# Patient Record
Sex: FEMALE | Race: WHITE | Employment: STUDENT | URBAN - METROPOLITAN AREA
[De-identification: names, ages, dates, MRNs, and addresses within clinical notes are randomized per-mention and may not be internally consistent; named-entity substitution may affect disease eponyms.]

---

## 2017-01-12 ENCOUNTER — OFFICE VISIT (OUTPATIENT)
Dept: DERMATOLOGY CLINIC | Facility: CLINIC | Age: 22
End: 2017-01-12

## 2017-01-12 DIAGNOSIS — L25.9 CONTACT DERMATITIS AND ECZEMA: Primary | ICD-10-CM

## 2017-01-12 DIAGNOSIS — L30.9 DERMATITIS: ICD-10-CM

## 2017-01-12 PROCEDURE — 99212 OFFICE O/P EST SF 10 MIN: CPT | Performed by: DERMATOLOGY

## 2017-01-29 NOTE — PROGRESS NOTES
Stella Muro is a 25year old female. Patient presents with:  Rash: Patient presents for f/u of dermatitis to right hand. LOV 12/28/16. Using levocetrizine and betamethasone without improvement. Stopped using mupirocin x 1 week ago.  Rash is irrita Not on file    Alcohol Use: No    Drug Use: Not on file    Sexual Activity: Not on file   Not on file  Other Topics Concern    Caffeine Concern No    Pt has a pacemaker No    Pt has a defibrillator No    Reaction to local anesthetic No     Social History N Previous impetigo resolved. Betamethasone 3-4 times daily. Moisturizers. Hypopigmentation anticipate this will take some time to resolve. Erythema will likely persist for a while. Overall improved. Discussed. Pathophysiology discussed with patient.

## 2017-03-28 RX ORDER — LEVOCETIRIZINE DIHYDROCHLORIDE 5 MG/1
TABLET, FILM COATED ORAL
Qty: 30 TABLET | Refills: 12 | Status: SHIPPED | OUTPATIENT
Start: 2017-03-28 | End: 2018-04-19

## 2017-07-21 ENCOUNTER — OFFICE VISIT (OUTPATIENT)
Dept: DERMATOLOGY CLINIC | Facility: CLINIC | Age: 22
End: 2017-07-21

## 2017-07-21 DIAGNOSIS — R21 RASH AND OTHER NONSPECIFIC SKIN ERUPTION: ICD-10-CM

## 2017-07-21 DIAGNOSIS — M25.40 JOINT SWELLING: Primary | ICD-10-CM

## 2017-07-21 DIAGNOSIS — R53.83 FATIGUE, UNSPECIFIED TYPE: ICD-10-CM

## 2017-07-21 PROCEDURE — 99213 OFFICE O/P EST LOW 20 MIN: CPT | Performed by: DERMATOLOGY

## 2017-07-21 PROCEDURE — 99212 OFFICE O/P EST SF 10 MIN: CPT | Performed by: DERMATOLOGY

## 2017-07-31 NOTE — PROGRESS NOTES
Teodoro Aguillon is a 23year old female. Patient presents with:  Derm Problem: Patient presents for f/u of dermtitis. LOV 1/12/2017. Patient using mupirocin, TAC cream, and levocetrizine to right hand w/o much improvment. 3/10 pain.  Per mother, \"her KNEE SURGERY Left    Social History  Social History   Marital status: Single  Spouse name: N/A    Years of education: N/A  Number of children: N/A     Occupational History  None on file     Social History Main Topics   Smoking status: Never Smoker    Smoke No other skin complaints. Physical examination:  Well-developed well-nourished patient alert oriented in no acute distress.       Exam performed, including scalp, head, neck, face,nails, hair, external eyes, including conjunctival mucosa, eyelids, oral encounter diagnosis)  Rash and other nonspecific skin eruption  Fatigue, unspecified type      Orders Placed This Encounter      LETY, Direct, Reflex Titer + Spec AB      Rheumatoid Arthritis Factor      CBC W Differential W Platelet      TSH W Reflex To Fr

## 2017-08-01 ENCOUNTER — LAB ENCOUNTER (OUTPATIENT)
Dept: LAB | Age: 22
End: 2017-08-01
Attending: DERMATOLOGY
Payer: COMMERCIAL

## 2017-08-01 DIAGNOSIS — R53.83 FATIGUE, UNSPECIFIED TYPE: ICD-10-CM

## 2017-08-01 DIAGNOSIS — M25.40 JOINT SWELLING: ICD-10-CM

## 2017-08-01 DIAGNOSIS — R21 RASH AND OTHER NONSPECIFIC SKIN ERUPTION: ICD-10-CM

## 2017-08-01 LAB
ALBUMIN SERPL BCP-MCNC: 4 G/DL (ref 3.5–4.8)
ALBUMIN/GLOB SERPL: 1.3 {RATIO} (ref 1–2)
ALP SERPL-CCNC: 62 U/L (ref 39–325)
ALT SERPL-CCNC: 18 U/L (ref 14–54)
ANION GAP SERPL CALC-SCNC: 8 MMOL/L (ref 0–18)
AST SERPL-CCNC: 20 U/L (ref 15–41)
BASOPHILS # BLD: 0.1 K/UL (ref 0–0.2)
BASOPHILS NFR BLD: 1 %
BILIRUB SERPL-MCNC: 0.3 MG/DL (ref 0.3–1.2)
BUN SERPL-MCNC: 13 MG/DL (ref 8–20)
BUN/CREAT SERPL: 13.7 (ref 10–20)
CALCIUM SERPL-MCNC: 9.1 MG/DL (ref 8.5–10.5)
CHLORIDE SERPL-SCNC: 100 MMOL/L (ref 95–110)
CO2 SERPL-SCNC: 26 MMOL/L (ref 22–32)
CREAT SERPL-MCNC: 0.95 MG/DL (ref 0.5–1.5)
EOSINOPHIL # BLD: 0.1 K/UL (ref 0–0.7)
EOSINOPHIL NFR BLD: 2 %
ERYTHROCYTE [DISTWIDTH] IN BLOOD BY AUTOMATED COUNT: 18.2 % (ref 11–15)
GLOBULIN PLAS-MCNC: 3.1 G/DL (ref 2.5–3.7)
GLUCOSE SERPL-MCNC: 79 MG/DL (ref 70–99)
HCT VFR BLD AUTO: 39.5 % (ref 35–48)
HGB BLD-MCNC: 12.9 G/DL (ref 12–16)
LYMPHOCYTES # BLD: 3.1 K/UL (ref 1–4)
LYMPHOCYTES NFR BLD: 40 %
MCH RBC QN AUTO: 25.3 PG (ref 27–32)
MCHC RBC AUTO-ENTMCNC: 32.7 G/DL (ref 32–37)
MCV RBC AUTO: 77.6 FL (ref 80–100)
MONOCYTES # BLD: 0.5 K/UL (ref 0–1)
MONOCYTES NFR BLD: 7 %
NEUTROPHILS # BLD AUTO: 3.8 K/UL (ref 1.8–7.7)
NEUTROPHILS NFR BLD: 50 %
OSMOLALITY UR CALC.SUM OF ELEC: 277 MOSM/KG (ref 275–295)
PLATELET # BLD AUTO: 217 K/UL (ref 140–400)
PMV BLD AUTO: 10.1 FL (ref 7.4–10.3)
POTASSIUM SERPL-SCNC: 4.2 MMOL/L (ref 3.3–5.1)
PROT SERPL-MCNC: 7.1 G/DL (ref 5.9–8.4)
RBC # BLD AUTO: 5.09 M/UL (ref 3.7–5.4)
RHEUMATOID FACT SER QL: <5 IU/ML
SODIUM SERPL-SCNC: 134 MMOL/L (ref 136–144)
TSH SERPL-ACNC: 1.86 UIU/ML (ref 0.45–5.33)
WBC # BLD AUTO: 7.7 K/UL (ref 4–11)

## 2017-08-01 PROCEDURE — 86235 NUCLEAR ANTIGEN ANTIBODY: CPT

## 2017-08-01 PROCEDURE — 86225 DNA ANTIBODY NATIVE: CPT

## 2017-08-01 PROCEDURE — 86431 RHEUMATOID FACTOR QUANT: CPT

## 2017-08-01 PROCEDURE — 36415 COLL VENOUS BLD VENIPUNCTURE: CPT

## 2017-08-01 PROCEDURE — 86039 ANTINUCLEAR ANTIBODIES (ANA): CPT

## 2017-08-01 PROCEDURE — 86038 ANTINUCLEAR ANTIBODIES: CPT

## 2017-08-01 PROCEDURE — 84443 ASSAY THYROID STIM HORMONE: CPT

## 2017-08-01 PROCEDURE — 80053 COMPREHEN METABOLIC PANEL: CPT

## 2017-08-01 PROCEDURE — 85025 COMPLETE CBC W/AUTO DIFF WBC: CPT

## 2017-08-02 DIAGNOSIS — R53.83 OTHER FATIGUE: ICD-10-CM

## 2017-08-02 DIAGNOSIS — R79.89 ABNORMAL CBC: Primary | ICD-10-CM

## 2017-08-03 ENCOUNTER — APPOINTMENT (OUTPATIENT)
Dept: LAB | Age: 22
End: 2017-08-03
Attending: DERMATOLOGY
Payer: COMMERCIAL

## 2017-08-03 DIAGNOSIS — R53.83 OTHER FATIGUE: ICD-10-CM

## 2017-08-03 DIAGNOSIS — R79.89 ABNORMAL CBC: ICD-10-CM

## 2017-08-03 LAB
FERRITIN SERPL IA-MCNC: 6 NG/ML (ref 11–307)
IRON SATN MFR SERPL: 7 % (ref 15–50)
IRON SERPL-MCNC: 27 MCG/DL (ref 28–170)
TIBC SERPL-MCNC: 384 MCG/DL (ref 228–428)
TRANSFERRIN SERPL-MCNC: 291 MG/DL (ref 192–382)

## 2017-08-03 PROCEDURE — 84466 ASSAY OF TRANSFERRIN: CPT

## 2017-08-03 PROCEDURE — 36415 COLL VENOUS BLD VENIPUNCTURE: CPT

## 2017-08-03 PROCEDURE — 82728 ASSAY OF FERRITIN: CPT

## 2017-08-03 PROCEDURE — 83540 ASSAY OF IRON: CPT

## 2017-08-03 NOTE — PROGRESS NOTES
Unable to add labs to specimen. Patient informed to come in for blood work with verbal understanding.

## 2017-08-03 NOTE — PROGRESS NOTES
Order entered for iron studes--cbc with decreased h/h and low mcv pt c/o fatigue. Added to specimen in lab if possible to do from specimen they have.

## 2017-08-04 LAB
ANA NUCLEOLAR TITR SER IF: <40 {TITER}
DSDNA AB TITR SER: <10 {TITER}

## 2017-08-08 LAB
ENA SM IGG SER QL: NEGATIVE
ENA SM+RNP AB SER QL: NEGATIVE
ENA SS-A AB SER QL IA: NEGATIVE
ENA SS-B AB SER QL IA: NEGATIVE

## 2017-08-17 ENCOUNTER — HOSPITAL ENCOUNTER (OUTPATIENT)
Dept: GENERAL RADIOLOGY | Age: 22
Discharge: HOME | End: 2017-08-17
Attending: INTERNAL MEDICINE
Payer: COMMERCIAL

## 2017-08-17 ENCOUNTER — APPOINTMENT (OUTPATIENT)
Dept: LAB | Age: 22
End: 2017-08-17
Attending: INTERNAL MEDICINE
Payer: COMMERCIAL

## 2017-08-17 ENCOUNTER — OFFICE VISIT (OUTPATIENT)
Dept: RHEUMATOLOGY | Facility: CLINIC | Age: 22
End: 2017-08-17

## 2017-08-17 VITALS
DIASTOLIC BLOOD PRESSURE: 75 MMHG | BODY MASS INDEX: 27.74 KG/M2 | SYSTOLIC BLOOD PRESSURE: 109 MMHG | HEART RATE: 88 BPM | WEIGHT: 183 LBS | HEIGHT: 68 IN

## 2017-08-17 DIAGNOSIS — M79.641 BILATERAL HAND PAIN: ICD-10-CM

## 2017-08-17 DIAGNOSIS — M79.642 BILATERAL HAND PAIN: ICD-10-CM

## 2017-08-17 DIAGNOSIS — M25.50 POLYARTHRALGIA: ICD-10-CM

## 2017-08-17 DIAGNOSIS — M79.10 MYALGIA: ICD-10-CM

## 2017-08-17 DIAGNOSIS — M25.50 POLYARTHRALGIA: Primary | ICD-10-CM

## 2017-08-17 LAB
CK SERPL-CCNC: 98 U/L (ref 38–234)
CRP SERPL-MCNC: 0.7 MG/DL (ref 0–0.9)
ERYTHROCYTE [SEDIMENTATION RATE] IN BLOOD: 9 MM/HR (ref 0–20)
LDH SERPL L TO P-CCNC: 158 U/L (ref 98–192)

## 2017-08-17 PROCEDURE — 99214 OFFICE O/P EST MOD 30 MIN: CPT | Performed by: INTERNAL MEDICINE

## 2017-08-17 PROCEDURE — 86140 C-REACTIVE PROTEIN: CPT

## 2017-08-17 PROCEDURE — 82085 ASSAY OF ALDOLASE: CPT

## 2017-08-17 PROCEDURE — 36415 COLL VENOUS BLD VENIPUNCTURE: CPT

## 2017-08-17 PROCEDURE — 99212 OFFICE O/P EST SF 10 MIN: CPT | Performed by: INTERNAL MEDICINE

## 2017-08-17 PROCEDURE — 73120 X-RAY EXAM OF HAND: CPT | Performed by: INTERNAL MEDICINE

## 2017-08-17 PROCEDURE — 86200 CCP ANTIBODY: CPT

## 2017-08-17 PROCEDURE — 83615 LACTATE (LD) (LDH) ENZYME: CPT

## 2017-08-17 PROCEDURE — 82550 ASSAY OF CK (CPK): CPT

## 2017-08-17 PROCEDURE — 85652 RBC SED RATE AUTOMATED: CPT

## 2017-08-17 RX ORDER — MELOXICAM 15 MG/1
15 TABLET ORAL DAILY
Qty: 30 TABLET | Refills: 1 | Status: SHIPPED | OUTPATIENT
Start: 2017-08-17 | End: 2017-09-08

## 2017-08-17 RX ORDER — PNV NO.95/FERROUS FUM/FOLIC AC 28MG-0.8MG
325 TABLET ORAL DAILY
COMMUNITY

## 2017-08-17 RX ORDER — ACETAMINOPHEN 325 MG/1
325 TABLET ORAL EVERY 6 HOURS PRN
COMMUNITY

## 2017-08-17 NOTE — PROGRESS NOTES
Larisa Armando is a 23year old female who presents for Patient presents with:  Hand Pain: fingers  Wrist Pain: bilateral  Ankle Pain: bilatetral  .   HPI:     I had the pleasure of seeing Larisa Armando on 8/17/2017 for evaluation.      She is a pleasa mupirocin 2 % External Ointment Apply 1 Application topically 3 (three) times daily. Disp: 15 g Rfl: 3   triamcinolone acetonide 0.1 % External Cream Apply topically 2 (two) times daily.  Apply bid Disp: 80 g Rfl: 3      Past Medical History:   Diagnosis Size: large)   Pulse 88   Ht 5' 8\" (1.727 m)   Wt 183 lb (83 kg)   BMI 27.83 kg/m²   HEENT: Clear oropharynx, no oral ulcers, EOM intact, clear sclear, PERRLA, pleasant, no acute distress, no CAD, no neck tendnerness, good ROM,   CVS: RRR, no murmurs  RS: fL  10.1   Neutrophils %      %  50   Lymphocytes %      %  40   Monocytes %      %  7   Eosinophils %      %  2   Basophils %      %  1   Neutrophils Absolute      1.8 - 7.7 K/UL  3.8   Lymphocytes Absolute      1.0 - 4.0 K/UL  3.1   Monocytes Absolute

## 2017-08-18 ENCOUNTER — TELEPHONE (OUTPATIENT)
Dept: RHEUMATOLOGY | Facility: CLINIC | Age: 22
End: 2017-08-18

## 2017-08-18 LAB — CCP IGG SERPL-ACNC: 0.4 U/ML (ref 0–6.9)

## 2017-08-19 LAB — ALDOLASE, SERUM: 5.8 U/L

## 2017-09-08 RX ORDER — MELOXICAM 15 MG/1
15 TABLET ORAL DAILY
Qty: 30 TABLET | Refills: 3 | Status: SHIPPED | OUTPATIENT
Start: 2017-09-08 | End: 2019-12-30 | Stop reason: ALTCHOICE

## 2017-09-08 NOTE — TELEPHONE ENCOUNTER
LOV 8/17/17 F/U 9/29/17    Med pended for PP    Component      Latest Ref Rng & Units 8/1/2017   Glucose      70 - 99 mg/dL 79   Sodium      136 - 144 mmol/L 134 (L)   Potassium      3.3 - 5.1 mmol/L 4.2   Chloride      95 - 110 mmol/L 100   Carbon Dioxide

## 2017-09-29 ENCOUNTER — OFFICE VISIT (OUTPATIENT)
Dept: RHEUMATOLOGY | Facility: CLINIC | Age: 22
End: 2017-09-29

## 2017-09-29 VITALS
BODY MASS INDEX: 28.64 KG/M2 | SYSTOLIC BLOOD PRESSURE: 107 MMHG | HEIGHT: 68 IN | HEART RATE: 80 BPM | DIASTOLIC BLOOD PRESSURE: 72 MMHG | WEIGHT: 189 LBS | TEMPERATURE: 97 F

## 2017-09-29 DIAGNOSIS — M79.641 BILATERAL HAND PAIN: ICD-10-CM

## 2017-09-29 DIAGNOSIS — M25.50 POLYARTHRALGIA: Primary | ICD-10-CM

## 2017-09-29 DIAGNOSIS — M79.642 BILATERAL HAND PAIN: ICD-10-CM

## 2017-09-29 PROCEDURE — 99212 OFFICE O/P EST SF 10 MIN: CPT | Performed by: INTERNAL MEDICINE

## 2017-09-29 PROCEDURE — 99213 OFFICE O/P EST LOW 20 MIN: CPT | Performed by: INTERNAL MEDICINE

## 2017-09-29 RX ORDER — NABUMETONE 750 MG/1
750 TABLET, FILM COATED ORAL 2 TIMES DAILY
Qty: 60 TABLET | Refills: 1 | Status: SHIPPED | OUTPATIENT
Start: 2017-09-29 | End: 2017-11-14

## 2017-09-29 NOTE — PROGRESS NOTES
Zita Gomez is a 23year old female who presents for Patient presents with:  Joint Pain: finger and ankle pain  . HPI:     I had the pleasure of seeing Zita Gomez on 8/17/2017 for evaluation.      She is a pleasant 23year old who has polyartha acetaminophen 325 MG Oral Tab Take 325 mg by mouth every 6 (six) hours as needed.  Disp:  Rfl:    mupirocin 2 % External Ointment Use tid to infection Disp: 30 g Rfl: 2   LEVOCETIRIZINE DIHYDROCHLORIDE 5 MG Oral Tab TAKE 1 TABLET BY MOUTH IN THE EVENING D no heartburn, no dyshpagia, no BRBPR or melena  : no dysuria, n  Neuro: No numbness or tingling, no headache, no hx of seizures,   Psych: no hx of anxiety or depression  ENDO: no hx of thyroid disease, no hx of DM  Joint/Muscluskeltal: see HPI,   All oth 5.40 M/UL  5.09   Hemoglobin      12.0 - 16.0 g/dL  12.9   Hematocrit      35.0 - 48.0 %  39.5   MCV      80.0 - 100.0 fL  77.6 (L)   MCH      27.0 - 32.0 pg  25.3 (L)   MCHC      32.0 - 37.0 g/dl  32.7   RDW      11.0 - 15.0 %  18.2 (H)   Platelet Count off on mri for now  - conisder in the future.        Janae Rosenberg MD  8/17/2017  4:07 PM

## 2017-09-29 NOTE — PATIENT INSTRUCTIONS
1. Stop meloxicam  2. Try nabumetone 750mg twice a day   3. Plan to repeat tests or work up in 6 months   4. Hold off on mri for now  - conisder in the future.

## 2017-11-15 RX ORDER — NABUMETONE 750 MG/1
TABLET, FILM COATED ORAL
Qty: 60 TABLET | Refills: 1 | Status: SHIPPED | OUTPATIENT
Start: 2017-11-15 | End: 2018-03-22

## 2018-01-03 ENCOUNTER — OFFICE VISIT (OUTPATIENT)
Dept: DERMATOLOGY CLINIC | Facility: CLINIC | Age: 23
End: 2018-01-03

## 2018-01-03 DIAGNOSIS — L30.9 DERMATITIS: Primary | ICD-10-CM

## 2018-01-03 PROCEDURE — 99212 OFFICE O/P EST SF 10 MIN: CPT | Performed by: DERMATOLOGY

## 2018-01-03 PROCEDURE — 99213 OFFICE O/P EST LOW 20 MIN: CPT | Performed by: DERMATOLOGY

## 2018-01-03 RX ORDER — ADAPALENE 45 G/G
GEL TOPICAL
Qty: 45 G | Refills: 6 | Status: SHIPPED | OUTPATIENT
Start: 2018-01-03 | End: 2018-11-29

## 2018-01-03 RX ORDER — CLINDAMYCIN PHOSPHATE 10 MG/G
1 GEL TOPICAL 2 TIMES DAILY
Qty: 60 G | Refills: 12 | Status: SHIPPED | OUTPATIENT
Start: 2018-01-03 | End: 2018-11-29

## 2018-01-10 RX ORDER — BETAMETHASONE DIPROPIONATE 0.5 MG/G
CREAM TOPICAL
Qty: 45 G | Refills: 3 | Status: SHIPPED | OUTPATIENT
Start: 2018-01-10 | End: 2019-06-17

## 2018-01-14 NOTE — PROGRESS NOTES
Kalia Harrison is a 23year old female. Patient presents with:  Rash: Pt here for 6 mo f/u; sent by rheumatologist for rash at L arm and L breast, comes & goes, itchy. Pt using betameth w/ relief from itching but rash remains.   Pt clrd by [facility] NABUMETONE 750 MG Oral Tab TAKE 1 TABLET BY MOUTH TWICE DAILY Disp: 60 tablet Rfl: 1   Ferrous Sulfate (IRON) 325 (65 Fe) MG Oral Tab Take 325 mg by mouth daily.  Disp:  Rfl:    acetaminophen 325 MG Oral Tab Take 325 mg by mouth every 6 (six) hours as nee HPI :      Patient presents with:  Rash: Pt here for 6 mo f/u; sent by rheumatologist for rash at L arm and L breast, comes & goes, itchy. Pt using betameth w/ relief from itching but rash remains. Pt clrd by rheumatologist (no dx). Folliculitis, more diffuse dermographism, rashes have improved. Patient not in the pool as much which seems to be helping. Continue follow-up as planned with rheumatology. Labs reviewed noncontributory meds in grid. Skin care instructions reviewed.   Nicci Mcdonald

## 2018-03-23 RX ORDER — NABUMETONE 750 MG/1
TABLET, FILM COATED ORAL
Qty: 60 TABLET | Refills: 1 | Status: SHIPPED | OUTPATIENT
Start: 2018-03-23 | End: 2018-08-22

## 2018-04-19 RX ORDER — LEVOCETIRIZINE DIHYDROCHLORIDE 5 MG/1
TABLET, FILM COATED ORAL
Qty: 30 TABLET | Refills: 12 | Status: SHIPPED | OUTPATIENT
Start: 2018-04-19 | End: 2019-05-23

## 2018-08-23 RX ORDER — NABUMETONE 750 MG/1
TABLET, FILM COATED ORAL
Qty: 60 TABLET | Refills: 1 | Status: SHIPPED | OUTPATIENT
Start: 2018-08-23 | End: 2019-02-16

## 2018-08-23 NOTE — TELEPHONE ENCOUNTER
LOV:9-29  Last Filled:3-23, #60 with 1 refill  Labs:8-1-17, ALT 18 and AST 20  No future appointments.     Please Advise

## 2018-08-23 NOTE — TELEPHONE ENCOUNTER
Phoned patient and informed her to schedule an appointment with Dr. Shaila Arroyo in September. Patient to call back to schedule.

## 2018-09-12 ENCOUNTER — OFFICE VISIT (OUTPATIENT)
Dept: DERMATOLOGY CLINIC | Facility: CLINIC | Age: 23
End: 2018-09-12
Payer: COMMERCIAL

## 2018-09-12 DIAGNOSIS — L70.0 ACNE VULGARIS: ICD-10-CM

## 2018-09-12 DIAGNOSIS — L01.00 IMPETIGO: ICD-10-CM

## 2018-09-12 DIAGNOSIS — R23.8 BLISTERS OF MULTIPLE SITES: Primary | ICD-10-CM

## 2018-09-12 DIAGNOSIS — L30.9 DERMATITIS: ICD-10-CM

## 2018-09-12 PROCEDURE — 99212 OFFICE O/P EST SF 10 MIN: CPT | Performed by: DERMATOLOGY

## 2018-09-12 PROCEDURE — 99213 OFFICE O/P EST LOW 20 MIN: CPT | Performed by: DERMATOLOGY

## 2018-09-14 ENCOUNTER — APPOINTMENT (OUTPATIENT)
Dept: LAB | Age: 23
End: 2018-09-14
Attending: DERMATOLOGY
Payer: COMMERCIAL

## 2018-09-14 DIAGNOSIS — R23.8 BLISTERS OF MULTIPLE SITES: ICD-10-CM

## 2018-09-14 PROCEDURE — 36415 COLL VENOUS BLD VENIPUNCTURE: CPT

## 2018-09-14 PROCEDURE — 84311 SPECTROPHOTOMETRY: CPT

## 2018-09-19 LAB — PORPHYRINS TOTAL, PLASMA: <1 MCG/DL

## 2018-09-24 NOTE — PROGRESS NOTES
Momo Killian is a 26year old female. Patient presents with:  Acne: LOV 1-3-18. Pt c/o redness, sm bumps at cheeks, using clindagel and adapalene once daily, followed by lotion. Pt c/o burning after this tx. Also c/o dry & oily skin at face. TAKE 1 TABLET BY MOUTH IN THE EVENING Disp: 30 tablet Rfl: 12   betamethasone dipropionate 0.05 % External Cream Use bid Disp: 45 g Rfl: 3   Clindamycin Phosphate 1 % External Gel Apply 1 Application topically 2 (two) times daily.  Use bid as directed Disp: Concerns:         Service: Not Asked        Blood Transfusions: Not Asked        Caffeine Concern: No        Occupational Exposure: Not Asked        Hobby Hazards: Not Asked        Sleep Concern: Not Asked        Stress Concern: Not Asked        We presents with concerns above. Denies any other systemic complaints. No fevers, chills, night sweats, photosensitivity, lymph node swelling. No other skin complaints. History, medications, allergies as noted.       Nothing new or different no unusual exp reviewed noncontributory meds in grid. Skin care instructions reviewed. [place] use of emollients. Pathophysiology reviewed. Consider Contac allergy in differential.  Consider patch testing.   Patient will let us know how they are doing over the next se

## 2018-10-20 ENCOUNTER — HOSPITAL ENCOUNTER (OUTPATIENT)
Dept: GENERAL RADIOLOGY | Facility: HOSPITAL | Age: 23
Discharge: HOME | End: 2018-10-20
Attending: INTERNAL MEDICINE
Payer: COMMERCIAL

## 2018-10-20 ENCOUNTER — OFFICE VISIT (OUTPATIENT)
Dept: RHEUMATOLOGY | Facility: CLINIC | Age: 23
End: 2018-10-20
Payer: COMMERCIAL

## 2018-10-20 VITALS
HEIGHT: 68 IN | BODY MASS INDEX: 30.62 KG/M2 | WEIGHT: 202 LBS | SYSTOLIC BLOOD PRESSURE: 117 MMHG | HEART RATE: 89 BPM | DIASTOLIC BLOOD PRESSURE: 80 MMHG

## 2018-10-20 DIAGNOSIS — L30.9 DERMATITIS: ICD-10-CM

## 2018-10-20 DIAGNOSIS — M54.89 INFLAMMATORY BACK PAIN: ICD-10-CM

## 2018-10-20 DIAGNOSIS — M13.0 POLYARTHRITIS: Primary | ICD-10-CM

## 2018-10-20 PROCEDURE — 72202 X-RAY EXAM SI JOINTS 3/> VWS: CPT | Performed by: INTERNAL MEDICINE

## 2018-10-20 PROCEDURE — 99212 OFFICE O/P EST SF 10 MIN: CPT | Performed by: INTERNAL MEDICINE

## 2018-10-20 PROCEDURE — 99214 OFFICE O/P EST MOD 30 MIN: CPT | Performed by: INTERNAL MEDICINE

## 2018-10-20 NOTE — PROGRESS NOTES
Stella Muro is a 26year old female who presents for Patient presents with:  Joint Pain  Back Pain  Finger Pain  . HPI:     I had the pleasure of seeing Stella Muro on 8/17/2017 for evaluation.      She is a pleasant 23year old who has polyarth it's constant. This doesn't stop her from doing things. It feels sore and achy. She has redness over her cheeks - she was given rosacea meds   She gets sores on her hands - for dermatitis.  - in 9/2018 - she had porphyria like blisters - it started 2 year Onset   • Asthma Mother    • Arthritis Mother         Rheumatoid Arthritis   • Fibromyalgia Mother    • Ear Problems Brother         Hearing Loss   • Asthma Brother    • Arthritis Maternal Grandmother         Rheumatoid Arthritis   • Diabetes Maternal [facility] and the [place] right hand. She is double jointed inher finger -   Tender in b/l ankels, no swleling   Tender in right 1st toe - with toe nail deforminty - due to mutliple psedomonas infection as a child   Mid line lower back tendnerss.    No si joint tendnerness  EXTREM K/UL  0.1   Iron, Serum      28 - 170 mcg/dL 27 (L)    Iron Bind. Cap.(TIBC)      228 - 428 mcg/dL 384    IRON SATURATION      15 - 50 % 7 (L)    TRANSFERRIN      192 - 382 mg/dL 291    Anti-Sjogren's A      Negative  Negative   Anti-Sjogren's B      Negati 11:58 AM  - [place]ed [place]  through [address]

## 2018-10-20 NOTE — PATIENT INSTRUCTIONS
1. Cont. nabumetone 750mg twice a day   2. Check MRI right hand   3. Lab tests   4.  Check si joint xray

## 2018-10-22 ENCOUNTER — LAB ENCOUNTER (OUTPATIENT)
Dept: LAB | Age: 23
End: 2018-10-22
Attending: INTERNAL MEDICINE
Payer: COMMERCIAL

## 2018-10-22 DIAGNOSIS — M13.0 POLYARTHRITIS: ICD-10-CM

## 2018-10-22 DIAGNOSIS — M54.89 INFLAMMATORY BACK PAIN: ICD-10-CM

## 2018-10-22 PROCEDURE — 86235 NUCLEAR ANTIGEN ANTIBODY: CPT

## 2018-10-22 PROCEDURE — 80053 COMPREHEN METABOLIC PANEL: CPT

## 2018-10-22 PROCEDURE — 86225 DNA ANTIBODY NATIVE: CPT

## 2018-10-22 PROCEDURE — 36415 COLL VENOUS BLD VENIPUNCTURE: CPT

## 2018-10-22 PROCEDURE — 86812 HLA TYPING A B OR C: CPT

## 2018-10-22 PROCEDURE — 85027 COMPLETE CBC AUTOMATED: CPT

## 2018-10-22 PROCEDURE — 85652 RBC SED RATE AUTOMATED: CPT

## 2018-10-22 PROCEDURE — 86039 ANTINUCLEAR ANTIBODIES (ANA): CPT

## 2018-10-22 PROCEDURE — 86038 ANTINUCLEAR ANTIBODIES: CPT

## 2018-10-22 PROCEDURE — 86140 C-REACTIVE PROTEIN: CPT

## 2018-11-02 ENCOUNTER — TELEPHONE (OUTPATIENT)
Dept: DERMATOLOGY CLINIC | Facility: CLINIC | Age: 23
End: 2018-11-02

## 2018-11-02 NOTE — TELEPHONE ENCOUNTER
Pt asking Metronidazole cream. Pt states not helping with redness. Burns when putting on. Is there anything else she can use?

## 2018-11-02 NOTE — TELEPHONE ENCOUNTER
LOV 9/2018. Please advise if you would like pt decrease frequency of metrocream.Alt medication? Currently using BID.  Please advise

## 2018-11-03 RX ORDER — DESONIDE 0.5 MG/G
CREAM TOPICAL
Qty: 15 G | Refills: 0 | Status: SHIPPED | OUTPATIENT
Start: 2018-11-03 | End: 2021-01-30

## 2018-11-03 NOTE — TELEPHONE ENCOUNTER
LMTCB. Per [address] Mckinney pt ok to stay on 3x week for a couple of weeks only. Please see the rest of the message below.

## 2018-11-03 NOTE — TELEPHONE ENCOUNTER
Desonide cream--bid x1 week if better then decrease to qd and then to 3x per week. This is a steroid cream, safe for the face for a short time.       If redness not getting better over the next 2-3 weeks then try mixing the metrocream with this to see if t

## 2018-11-04 ENCOUNTER — HOSPITAL ENCOUNTER (OUTPATIENT)
Dept: MRI IMAGING | Facility: HOSPITAL | Age: 23
Discharge: HOME | End: 2018-11-04
Attending: INTERNAL MEDICINE
Payer: COMMERCIAL

## 2018-11-04 ENCOUNTER — HOSPITAL ENCOUNTER (OUTPATIENT)
Dept: GENERAL RADIOLOGY | Facility: HOSPITAL | Age: 23
Discharge: HOME | End: 2018-11-04
Attending: RADIOLOGY
Payer: COMMERCIAL

## 2018-11-04 DIAGNOSIS — M13.0 POLYARTHRITIS: ICD-10-CM

## 2018-11-04 DIAGNOSIS — R52 PAIN: ICD-10-CM

## 2018-11-04 PROCEDURE — A9575 INJ GADOTERATE MEGLUMI 0.1ML: HCPCS | Performed by: INTERNAL MEDICINE

## 2018-11-04 PROCEDURE — 73220 MRI UPPR EXTREMITY W/O&W/DYE: CPT | Performed by: INTERNAL MEDICINE

## 2018-11-04 PROCEDURE — 73130 X-RAY EXAM OF HAND: CPT | Performed by: RADIOLOGY

## 2018-11-06 ENCOUNTER — TELEPHONE (OUTPATIENT)
Dept: RHEUMATOLOGY | Facility: CLINIC | Age: 23
End: 2018-11-06

## 2018-11-07 ENCOUNTER — TELEPHONE (OUTPATIENT)
Dept: RHEUMATOLOGY | Facility: CLINIC | Age: 23
End: 2018-11-07

## 2018-11-07 NOTE — TELEPHONE ENCOUNTER
Pt called in wanting to discuss results of right hand MRI with Dr. Philip Puentes.    11/04 MRI conclusion:    \"Unremarkable right hand MRI without synovitis or osseous erosions to suggest inflammatory arthritis. \"

## 2018-11-08 NOTE — TELEPHONE ENCOUNTER
Talked to pt. -  her back and hand pain is around 3/10 pain. She has more back pain with period. She takes the nabumetone 750mg twice a day. She will continue this    - no evidence for SLe or sernegative sponsylathritis at this point. - will cont.  nsiads

## 2018-12-04 ENCOUNTER — PATIENT MESSAGE (OUTPATIENT)
Dept: PEDIATRICS CLINIC | Facility: CLINIC | Age: 23
End: 2018-12-04

## 2018-12-05 RX ORDER — CLINDAMYCIN PHOSPHATE 10 MG/G
1 GEL TOPICAL 2 TIMES DAILY
Qty: 60 G | Refills: 12 | Status: SHIPPED | OUTPATIENT
Start: 2018-12-05 | End: 2019-12-27

## 2018-12-05 RX ORDER — ADAPALENE 45 G/G
GEL TOPICAL
Qty: 45 G | Refills: 6 | Status: SHIPPED | OUTPATIENT
Start: 2018-12-05 | End: 2019-12-27

## 2018-12-05 NOTE — TELEPHONE ENCOUNTER
From: Kelli Gonzalez  To: Amber Luna MD  Sent: 12/4/2018 6:15 PM CST  Subject: Non-Urgent Medical Question    Hi, [facility].  I need documentation of my mumps vaccine by Friday 12/7/18, so I will be abl No

## 2019-01-07 ENCOUNTER — TELEPHONE (OUTPATIENT)
Dept: INTERNAL MEDICINE CLINIC | Facility: CLINIC | Age: 24
End: 2019-01-07

## 2019-01-09 NOTE — TELEPHONE ENCOUNTER
LO(V 9/12/18, pt has been RXed metrocream for her face at time of visit, which caused her skin to become more read and burn - she was then instructed to hold it, use desonide until it resolves and then retry metrocream. Pt did that and is now back to using

## 2019-01-10 NOTE — TELEPHONE ENCOUNTER
Will consider other options, I will review old meds etc. Thanks jania Messer to use the desonide for now intermittently.

## 2019-01-10 NOTE — TELEPHONE ENCOUNTER
Patient would like to try finacea gel or foam. She wants the one you think will be less irritating to sensitive skin.  Thanks Please send rx to [address]

## 2019-01-10 NOTE — TELEPHONE ENCOUNTER
See below--ok desonide for now--ok d/c metrocream.  One option would be Finacea--this is for the redness also--gel vs foam--the gel is more like the Differin gel the foam might have less alcohol in the base. All the options might need a pa.   Other option

## 2019-02-18 RX ORDER — NABUMETONE 750 MG/1
TABLET, FILM COATED ORAL
Qty: 60 TABLET | Refills: 1 | Status: SHIPPED | OUTPATIENT
Start: 2019-02-18 | End: 2019-05-23

## 2019-02-18 NOTE — TELEPHONE ENCOUNTER
Requested medication: NABUMETONE 750 MG Oral Tab  Last refilled: 8/23/18  #60 tablet 1 refill  LOV: 10/20/18  No future appointments.   Labs:   Component      Latest Ref Rng & Units 10/22/2018   Glucose      70 - 99 mg/dL 95   Sodium      136 - 144 mmol/L 1       Elise Pyle MD  10/20/2018   11:58 AM  - [place] Epic     Please advise.

## 2019-05-20 ENCOUNTER — TELEPHONE (OUTPATIENT)
Dept: FAMILY MEDICINE CLINIC | Facility: CLINIC | Age: 24
End: 2019-05-20

## 2019-05-20 NOTE — TELEPHONE ENCOUNTER
LOV 9/12/18, pt with hx of dermatitis, wants to know which sunscreen she should use - states she's been using coppertone and it's burning her skin - please advise.

## 2019-05-21 NOTE — TELEPHONE ENCOUNTER
Vanicream sensitive SPF 30 or 50 usually are favorite least irritating Neutrogena zinc is quite similar to this as is the Aveeno mineral, Anthelios mineral, blue lizard sensitive, banana boat baby zinc.  If she needs a chemical sunscreen that is more water

## 2019-05-23 RX ORDER — LEVOCETIRIZINE DIHYDROCHLORIDE 5 MG/1
TABLET, FILM COATED ORAL
Qty: 30 TABLET | Refills: 3 | Status: SHIPPED | OUTPATIENT
Start: 2019-05-23 | End: 2020-03-16

## 2019-05-23 RX ORDER — NABUMETONE 750 MG/1
TABLET, FILM COATED ORAL
Qty: 60 TABLET | Refills: 1 | Status: SHIPPED | OUTPATIENT
Start: 2019-05-23 | End: 2021-02-28

## 2019-05-23 NOTE — TELEPHONE ENCOUNTER
LOV:10-20  Last Filled:2-18, #60 with 1 refill  Labs:10-22, ALT 26 and AST 24  No future appointments.     Please Advise

## 2019-05-23 NOTE — TELEPHONE ENCOUNTER
Pt last seen in Dermatology 9/12/2018 for . . .    Blisters of multiple sites  (primary encounter diagnosis)  Dermatitis  Impetigo    Pt requesting refill of . . .     LEVOCETIRIZINE DIHYDROCHLORIDE 5 MG Oral Tab 30 tablet 12 4/19/2018    Sig:   TAKE 1 TABL

## 2019-06-15 ENCOUNTER — PATIENT MESSAGE (OUTPATIENT)
Dept: DERMATOLOGY CLINIC | Facility: CLINIC | Age: 24
End: 2019-06-15

## 2019-06-17 ENCOUNTER — OFFICE VISIT (OUTPATIENT)
Dept: DERMATOLOGY CLINIC | Facility: CLINIC | Age: 24
End: 2019-06-17
Payer: COMMERCIAL

## 2019-06-17 DIAGNOSIS — L30.4 INTERTRIGO: Primary | ICD-10-CM

## 2019-06-17 DIAGNOSIS — D23.9 BENIGN NEOPLASM OF SKIN, UNSPECIFIED LOCATION: ICD-10-CM

## 2019-06-17 DIAGNOSIS — L70.0 ACNE VULGARIS: ICD-10-CM

## 2019-06-17 DIAGNOSIS — R21 RASH AND OTHER NONSPECIFIC SKIN ERUPTION: ICD-10-CM

## 2019-06-17 DIAGNOSIS — L30.9 DERMATITIS: ICD-10-CM

## 2019-06-17 PROCEDURE — 99213 OFFICE O/P EST LOW 20 MIN: CPT | Performed by: DERMATOLOGY

## 2019-06-17 PROCEDURE — 99212 OFFICE O/P EST SF 10 MIN: CPT | Performed by: DERMATOLOGY

## 2019-06-17 RX ORDER — CLOTRIMAZOLE 1 %
CREAM (GRAM) TOPICAL
Qty: 60 G | Refills: 3 | Status: SHIPPED | OUTPATIENT
Start: 2019-06-17 | End: 2021-01-30

## 2019-06-17 NOTE — TELEPHONE ENCOUNTER
LOV 9/12/18 pt with a new onset of rash to mimi.  Axillae - onset March 2019, started off as a razur burn-like rash which is getting progressively worse - appt made

## 2019-06-17 NOTE — TELEPHONE ENCOUNTER
Betamethasone Dipropionate 0.05% rx request. Please review. Thank you.      LOV: 6/17/19  Last Refill:1/10/18

## 2019-06-18 RX ORDER — BETAMETHASONE DIPROPIONATE 0.5 MG/G
CREAM TOPICAL
Qty: 45 G | Refills: 6 | Status: SHIPPED | OUTPATIENT
Start: 2019-06-18 | End: 2021-01-30

## 2019-06-30 NOTE — PROGRESS NOTES
Elian Magana is a 27year old female. Patient presents with:  Derm Problem: LOV 9/12/18. New issue. Dryness to face, perioral area x 4 months. Redness and flaking. Using aveno lotion with some improvement.  Continues to use finacea foam to cheeks Social History:  Social History    Tobacco Use      Smoking status: Never Smoker      Smokeless tobacco: Never Used    Alcohol use: No    Drug use: Not on file                  Current Outpatient Medications:  clotrimazole 1 % External Cream Use bid Disp Occupational History      Not on file    Social Needs      Financial resource strain: Not on file      Food insecurity:        Worry: Not on file        Inability: Not on file      Transportation needs:        Medical: Not on file        Non-medical: Not Mother    • Arthritis Mother         Rheumatoid Arthritis   • Fibromyalgia Mother    • Ear Problems Brother         Hearing Loss   • Asthma Brother    • Arthritis Maternal Grandmother         Rheumatoid Arthritis   • Diabetes Maternal Grandfather    • Diab See medications in grid. Skin care regimen discussed at length including cleansers, makeup, face washing, sunscreen. Recheck in 6 -8 weeks if no improvement. Notify us promptly if problems tolerating regimen.   Consider more aggressive therapy if not res Prescriptions     Signed Prescriptions Disp Refills   • clotrimazole 1 % External Cream 60 g 3     Sig: Use bid   • Crisaborole 2 % External Ointment 60 g 3     Sig: Use bid       Intertrigo  (primary encounter diagnosis)  Dermatitis  Rash and other nonspe

## 2019-08-12 ENCOUNTER — APPOINTMENT (OUTPATIENT)
Dept: OTHER | Facility: HOSPITAL | Age: 24
End: 2019-08-12
Attending: EMERGENCY MEDICINE

## 2019-12-27 RX ORDER — ADAPALENE 45 G/G
GEL TOPICAL
Qty: 45 G | Refills: 3 | Status: SHIPPED | OUTPATIENT
Start: 2019-12-27 | End: 2021-01-30

## 2019-12-27 RX ORDER — CLINDAMYCIN PHOSPHATE 10 MG/G
GEL TOPICAL
Qty: 60 G | Refills: 3 | Status: SHIPPED | OUTPATIENT
Start: 2019-12-27 | End: 2021-01-30

## 2019-12-30 ENCOUNTER — OFFICE VISIT (OUTPATIENT)
Dept: INTERNAL MEDICINE CLINIC | Facility: CLINIC | Age: 24
End: 2019-12-30
Payer: COMMERCIAL

## 2019-12-30 VITALS
RESPIRATION RATE: 18 BRPM | HEART RATE: 90 BPM | BODY MASS INDEX: 30.31 KG/M2 | HEIGHT: 68 IN | WEIGHT: 200 LBS | TEMPERATURE: 98 F | SYSTOLIC BLOOD PRESSURE: 108 MMHG | DIASTOLIC BLOOD PRESSURE: 77 MMHG

## 2019-12-30 DIAGNOSIS — Z11.1 SCREENING FOR TUBERCULOSIS: ICD-10-CM

## 2019-12-30 DIAGNOSIS — D64.9 ANEMIA, UNSPECIFIED TYPE: ICD-10-CM

## 2019-12-30 DIAGNOSIS — Z00.00 PE (PHYSICAL EXAM), ROUTINE: Primary | ICD-10-CM

## 2019-12-30 PROCEDURE — 99385 PREV VISIT NEW AGE 18-39: CPT | Performed by: INTERNAL MEDICINE

## 2019-12-30 NOTE — PROGRESS NOTES
HPI:    Patient ID: Lashon Meehan is a 27year old female.   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 • mupirocin 2 % External Ointment Apply 1 Application topically 3 (three) times daily. 15 g 3   • triamcinolone acetonide 0.1 % External Cream Apply topically 2 (two) times daily.  Apply bid 80 g 3   • Desonide 0.05 % External Cream Use bid Gilberto Agosto menstrual period 12/14/2019.                ASSESSMENT/PLAN:   Pe (physical exam), routine  (primary encounter diagnosis)    Maintain a healthy diet , low saturated fat and low sugar diet  Keep good hydration  Maintain a regular activity /walking as tolerat

## 2019-12-31 ENCOUNTER — PATIENT MESSAGE (OUTPATIENT)
Dept: INTERNAL MEDICINE CLINIC | Facility: CLINIC | Age: 24
End: 2019-12-31

## 2019-12-31 ENCOUNTER — LAB ENCOUNTER (OUTPATIENT)
Dept: LAB | Age: 24
End: 2019-12-31
Attending: INTERNAL MEDICINE
Payer: COMMERCIAL

## 2019-12-31 DIAGNOSIS — Z11.1 SCREENING FOR TUBERCULOSIS: ICD-10-CM

## 2019-12-31 DIAGNOSIS — Z00.00 PE (PHYSICAL EXAM), ROUTINE: ICD-10-CM

## 2019-12-31 DIAGNOSIS — D64.9 ANEMIA, UNSPECIFIED TYPE: ICD-10-CM

## 2019-12-31 LAB
ALBUMIN SERPL-MCNC: 3.8 G/DL (ref 3.4–5)
ALBUMIN/GLOB SERPL: 0.9 {RATIO} (ref 1–2)
ALP LIVER SERPL-CCNC: 83 U/L (ref 52–144)
ALT SERPL-CCNC: 33 U/L (ref 13–56)
ANION GAP SERPL CALC-SCNC: 5 MMOL/L (ref 0–18)
AST SERPL-CCNC: 16 U/L (ref 15–37)
BASOPHILS # BLD AUTO: 0.06 X10(3) UL (ref 0–0.2)
BASOPHILS NFR BLD AUTO: 0.7 %
BILIRUB SERPL-MCNC: 0.4 MG/DL (ref 0.1–2)
BILIRUB UR QL: NEGATIVE
BUN BLD-MCNC: 17 MG/DL (ref 7–18)
BUN/CREAT SERPL: 17.2 (ref 10–20)
CALCIUM BLD-MCNC: 9.1 MG/DL (ref 8.5–10.1)
CHLORIDE SERPL-SCNC: 103 MMOL/L (ref 98–112)
CHOLEST SMN-MCNC: 269 MG/DL (ref ?–200)
CO2 SERPL-SCNC: 31 MMOL/L (ref 21–32)
COLOR UR: YELLOW
CREAT BLD-MCNC: 0.99 MG/DL (ref 0.55–1.02)
DEPRECATED HBV CORE AB SER IA-ACNC: 34.3 NG/ML (ref 12–114)
DEPRECATED RDW RBC AUTO: 40.1 FL (ref 35.1–46.3)
EOSINOPHIL # BLD AUTO: 0.13 X10(3) UL (ref 0–0.7)
EOSINOPHIL NFR BLD AUTO: 1.4 %
ERYTHROCYTE [DISTWIDTH] IN BLOOD BY AUTOMATED COUNT: 12.4 % (ref 11–15)
FOLATE SERPL-MCNC: 11.8 NG/ML (ref 8.7–?)
GLOBULIN PLAS-MCNC: 4.1 G/DL (ref 2.8–4.4)
GLUCOSE BLD-MCNC: 87 MG/DL (ref 70–99)
GLUCOSE UR-MCNC: NEGATIVE MG/DL
HCT VFR BLD AUTO: 47 % (ref 35–48)
HDLC SERPL-MCNC: 45 MG/DL (ref 40–59)
HGB BLD-MCNC: 15.5 G/DL (ref 12–16)
HGB UR QL STRIP.AUTO: NEGATIVE
IMM GRANULOCYTES # BLD AUTO: 0.01 X10(3) UL (ref 0–1)
IMM GRANULOCYTES NFR BLD: 0.1 %
IRON SATURATION: 19 % (ref 15–50)
IRON SERPL-MCNC: 70 UG/DL (ref 50–170)
KETONES UR-MCNC: NEGATIVE MG/DL
LDLC SERPL CALC-MCNC: 188 MG/DL (ref ?–100)
LYMPHOCYTES # BLD AUTO: 3.7 X10(3) UL (ref 1–4)
LYMPHOCYTES NFR BLD AUTO: 41.2 %
M PROTEIN MFR SERPL ELPH: 7.9 G/DL (ref 6.4–8.2)
MCH RBC QN AUTO: 29.2 PG (ref 26–34)
MCHC RBC AUTO-ENTMCNC: 33 G/DL (ref 31–37)
MCV RBC AUTO: 88.5 FL (ref 80–100)
MONOCYTES # BLD AUTO: 0.63 X10(3) UL (ref 0.1–1)
MONOCYTES NFR BLD AUTO: 7 %
NEUTROPHILS # BLD AUTO: 4.44 X10 (3) UL (ref 1.5–7.7)
NEUTROPHILS # BLD AUTO: 4.44 X10(3) UL (ref 1.5–7.7)
NEUTROPHILS NFR BLD AUTO: 49.6 %
NITRITE UR QL STRIP.AUTO: NEGATIVE
NONHDLC SERPL-MCNC: 224 MG/DL (ref ?–130)
OSMOLALITY SERPL CALC.SUM OF ELEC: 289 MOSM/KG (ref 275–295)
PATIENT FASTING Y/N/NP: YES
PATIENT FASTING Y/N/NP: YES
PH UR: 5 [PH] (ref 5–8)
PLATELET # BLD AUTO: 276 10(3)UL (ref 150–450)
POTASSIUM SERPL-SCNC: 4.2 MMOL/L (ref 3.5–5.1)
PROT UR-MCNC: NEGATIVE MG/DL
RBC # BLD AUTO: 5.31 X10(6)UL (ref 3.8–5.3)
RBC #/AREA URNS AUTO: 4 /HPF
SODIUM SERPL-SCNC: 139 MMOL/L (ref 136–145)
SP GR UR STRIP: 1.03 (ref 1–1.03)
TOTAL IRON BINDING CAPACITY: 362 UG/DL (ref 240–450)
TRANSFERRIN SERPL-MCNC: 243 MG/DL (ref 200–360)
TRIGL SERPL-MCNC: 179 MG/DL (ref 30–149)
TSI SER-ACNC: 2.79 MIU/ML (ref 0.36–3.74)
UROBILINOGEN UR STRIP-ACNC: <2
VIT B12 SERPL-MCNC: 392 PG/ML (ref 193–986)
VLDLC SERPL CALC-MCNC: 36 MG/DL (ref 0–30)
WBC # BLD AUTO: 9 X10(3) UL (ref 4–11)
WBC #/AREA URNS AUTO: 3 /HPF

## 2019-12-31 PROCEDURE — 82746 ASSAY OF FOLIC ACID SERUM: CPT

## 2019-12-31 PROCEDURE — 85025 COMPLETE CBC W/AUTO DIFF WBC: CPT

## 2019-12-31 PROCEDURE — 84443 ASSAY THYROID STIM HORMONE: CPT

## 2019-12-31 PROCEDURE — 86480 TB TEST CELL IMMUN MEASURE: CPT

## 2019-12-31 PROCEDURE — 82607 VITAMIN B-12: CPT

## 2019-12-31 PROCEDURE — 83540 ASSAY OF IRON: CPT

## 2019-12-31 PROCEDURE — 80053 COMPREHEN METABOLIC PANEL: CPT

## 2019-12-31 PROCEDURE — 84466 ASSAY OF TRANSFERRIN: CPT

## 2019-12-31 PROCEDURE — 82728 ASSAY OF FERRITIN: CPT

## 2019-12-31 PROCEDURE — 80061 LIPID PANEL: CPT

## 2019-12-31 PROCEDURE — 81001 URINALYSIS AUTO W/SCOPE: CPT

## 2019-12-31 PROCEDURE — 36415 COLL VENOUS BLD VENIPUNCTURE: CPT

## 2019-12-31 NOTE — TELEPHONE ENCOUNTER
Clinical staff, please advise pt when form is ready to     Dr. Roberto Owens, pt's labs drawn today all have \"final\" status. Please advise.

## 2019-12-31 NOTE — TELEPHONE ENCOUNTER
From: Krysta Sandoval  To: Dione Campbell MD  Sent: 12/31/2019 10:59 AM CST  Subject: Test Results Question    Hello I wanted to let you know that I have completed the lab test.    I wanted to know if you could let me know when I should come into th

## 2020-01-03 LAB
M TB IFN-G CD4+ T-CELLS BLD-ACNC: 0.03 IU/ML
M TB TUBERC IFN-G BLD QL: NEGATIVE
M TB TUBERC IGNF/MITOGEN IGNF CONTROL: >10 IU/ML
QUANTIFERON TB1 MINUS NIL: 0 IU/ML
QUANTIFERON TB2 MINUS NIL: 0 IU/ML

## 2020-01-05 ENCOUNTER — PATIENT MESSAGE (OUTPATIENT)
Dept: INTERNAL MEDICINE CLINIC | Facility: CLINIC | Age: 25
End: 2020-01-05

## 2020-01-06 NOTE — TELEPHONE ENCOUNTER
Please reference other MyChart encounter dated 12/31/19 where LPN sent pt response to this earlier today.

## 2020-01-06 NOTE — TELEPHONE ENCOUNTER
From: Vikas Chan  To: Tiffany Valadez MD  Sent: 1/5/2020 12:10 AM CST  Subject: Test Results Question    Hello I was told to check back over the weekend regarding my physical form for work. I saw in [address] that my TB results where in.     I w

## 2020-01-06 NOTE — TELEPHONE ENCOUNTER
Pt. Stopped by at [facility] location t  px form. Per Dr. Payton Marrow notes to complete form. Completed  TB test results as negative, copy of Tb test -Quantiferon TB and Original px form given to pt. Copy of px form sent to scanning.     Routed to

## 2020-02-25 ENCOUNTER — OFFICE VISIT (OUTPATIENT)
Dept: INTERNAL MEDICINE CLINIC | Facility: CLINIC | Age: 25
End: 2020-02-25
Payer: COMMERCIAL

## 2020-02-25 VITALS
BODY MASS INDEX: 30.54 KG/M2 | WEIGHT: 201.5 LBS | SYSTOLIC BLOOD PRESSURE: 107 MMHG | RESPIRATION RATE: 18 BRPM | DIASTOLIC BLOOD PRESSURE: 73 MMHG | TEMPERATURE: 98 F | HEIGHT: 68 IN | HEART RATE: 88 BPM

## 2020-02-25 DIAGNOSIS — Z02.82 ENCOUNTER FOR PHYSICAL EXAMINATION OF PROSPECTIVE ADOPTIVE PARENT: Primary | ICD-10-CM

## 2020-02-25 DIAGNOSIS — M25.562 ARTHRALGIA OF LEFT KNEE: ICD-10-CM

## 2020-02-25 PROCEDURE — 99214 OFFICE O/P EST MOD 30 MIN: CPT | Performed by: INTERNAL MEDICINE

## 2020-02-25 NOTE — PROGRESS NOTES
HPI:    Patient ID: Liset Zuniga is a 27year old female.   Patient presents with:  Complete Form: Pt is coming in for form completion for adoption    Patient presents today for physical exam for  Adoption of child not for herself but for her  Parents • CLINDAMYCIN PHOSPHATE 1 % External Gel APPLY TO AFFECTED AREA TWICE DAILY AS DIRECTED 60 g 3   • betamethasone dipropionate 0.05 % External Cream APPLY TO AFFECTED AREA TWICE DAILY 45 g 6   • clotrimazole 1 % External Cream Use bid 60 g 3   • Crisaborole Pulmonary/Chest: Effort normal and breath sounds normal. No respiratory distress. She has no wheezes. She has no rales. Abdominal: Soft. She exhibits no mass. There is no hepatosplenomegaly. There is no tenderness. There is no CVA tenderness.    [address]

## 2020-03-16 RX ORDER — LEVOCETIRIZINE DIHYDROCHLORIDE 5 MG/1
TABLET, FILM COATED ORAL
Qty: 30 TABLET | Refills: 3 | Status: SHIPPED | OUTPATIENT
Start: 2020-03-16 | End: 2021-01-30

## 2020-07-30 ENCOUNTER — WALK IN (OUTPATIENT)
Dept: URGENT CARE | Age: 25
End: 2020-07-30

## 2020-07-30 ENCOUNTER — TELEPHONE (OUTPATIENT)
Dept: SCHEDULING | Age: 25
End: 2020-07-30

## 2020-07-30 VITALS
BODY MASS INDEX: 28.73 KG/M2 | RESPIRATION RATE: 20 BRPM | SYSTOLIC BLOOD PRESSURE: 110 MMHG | HEART RATE: 84 BPM | TEMPERATURE: 97.7 F | DIASTOLIC BLOOD PRESSURE: 76 MMHG | WEIGHT: 194 LBS | HEIGHT: 69 IN

## 2020-07-30 DIAGNOSIS — Z11.1 SCREENING-PULMONARY TB: Primary | ICD-10-CM

## 2020-07-30 DIAGNOSIS — Z02.1 PRE-EMPLOYMENT EXAMINATION: ICD-10-CM

## 2020-07-30 PROCEDURE — 86580 TB INTRADERMAL TEST: CPT | Performed by: NURSE PRACTITIONER

## 2020-07-30 PROCEDURE — X0945 SELF PAY APN OR PA PERFORMED ADMINISTRATIVE PHYSICAL: HCPCS | Performed by: NURSE PRACTITIONER

## 2020-07-30 ASSESSMENT — ENCOUNTER SYMPTOMS
EYES NEGATIVE: 1
CONSTITUTIONAL NEGATIVE: 1
ENDOCRINE NEGATIVE: 1
ALLERGIC/IMMUNOLOGIC NEGATIVE: 1
PSYCHIATRIC NEGATIVE: 1
GASTROINTESTINAL NEGATIVE: 1
NEUROLOGICAL NEGATIVE: 1
RESPIRATORY NEGATIVE: 1

## 2020-08-01 ENCOUNTER — WALK IN (OUTPATIENT)
Dept: URGENT CARE | Age: 25
End: 2020-08-01

## 2020-08-01 DIAGNOSIS — Z11.1 ENCOUNTER FOR PPD SKIN TEST READING: Primary | ICD-10-CM

## 2020-08-01 LAB
INDURATION: NORMAL MM (ref 0–?)
SKIN TEST RESULT: NEGATIVE

## 2020-08-01 PROCEDURE — X1094 NO CHARGE VISIT: HCPCS | Performed by: NURSE PRACTITIONER

## 2020-12-11 ENCOUNTER — VIRTUAL PHONE E/M (OUTPATIENT)
Dept: INTERNAL MEDICINE CLINIC | Facility: CLINIC | Age: 25
End: 2020-12-11
Payer: COMMERCIAL

## 2020-12-11 ENCOUNTER — PATIENT MESSAGE (OUTPATIENT)
Dept: INTERNAL MEDICINE CLINIC | Facility: CLINIC | Age: 25
End: 2020-12-11

## 2020-12-11 DIAGNOSIS — Z20.822 EXPOSURE TO COVID-19 VIRUS: Primary | ICD-10-CM

## 2020-12-11 PROCEDURE — 99213 OFFICE O/P EST LOW 20 MIN: CPT | Performed by: INTERNAL MEDICINE

## 2020-12-11 NOTE — TELEPHONE ENCOUNTER
From: Momo Knapp  To: Shane Demarco MD  Sent: 12/11/2020 9:22 AM CST  Subject: Other    Dr. Amairani Onofre, I hope you are well.  I am contacting you that I need to schedule a time to come in for a COVID test.    A member of my house hol

## 2020-12-11 NOTE — TELEPHONE ENCOUNTER
To: John Lynn      From: Luz De Santiago RN      Created: 12/11/2020 10:09 AM        Nikkie Fernandez, Are you having any symptoms?  (This will tell us how best to help you.) Thank you, [facility] RN

## 2020-12-11 NOTE — PROGRESS NOTES
Telemedicine Visit for Respiratory Illness - Potential COVID-19 Infection    Virtual/Telephone Check-In    [address] verbally consents to a Telephone Check-In service on 12/11/20.  Patient understands and accepts financial responsibility for any de patella     Dizziness     Closed dislocation of patella     Onychia and paronychia of toe    Current Outpatient Medications   Medication Sig Dispense Refill   • LEVOCETIRIZINE DIHYDROCHLORIDE 5 MG Oral Tab TAKE 1 TABLET BY MOUTH IN THE EVENING 30 tablet 3 ask when they call to see if she can get it done later next week    Duration of service, in minutes: 7 min    Follow up: Call back if letter is needed or if symptoms develop    Please note that the following visit was completed using telephone communicatio

## 2020-12-17 ENCOUNTER — LAB ENCOUNTER (OUTPATIENT)
Dept: LAB | Age: 25
End: 2020-12-17
Attending: INTERNAL MEDICINE
Payer: COMMERCIAL

## 2020-12-17 DIAGNOSIS — Z20.822 EXPOSURE TO COVID-19 VIRUS: ICD-10-CM

## 2021-01-04 RX ORDER — ADAPALENE 45 G/G
GEL TOPICAL
Qty: 45 G | Refills: 3 | OUTPATIENT
Start: 2021-01-04

## 2021-01-04 RX ORDER — NABUMETONE 750 MG/1
750 TABLET, FILM COATED ORAL 2 TIMES DAILY
Qty: 60 TABLET | Refills: 1 | OUTPATIENT
Start: 2021-01-04

## 2021-01-04 RX ORDER — CLINDAMYCIN PHOSPHATE 10 MG/G
1 GEL TOPICAL 2 TIMES DAILY
Qty: 60 G | Refills: 3 | OUTPATIENT
Start: 2021-01-04

## 2021-01-04 RX ORDER — LEVOCETIRIZINE DIHYDROCHLORIDE 5 MG/1
5 TABLET, FILM COATED ORAL EVERY EVENING
Qty: 30 TABLET | Refills: 3 | OUTPATIENT
Start: 2021-01-04

## 2021-01-04 NOTE — TELEPHONE ENCOUNTER
Requested Prescriptions     Pending Prescriptions Disp Refills   • Nabumetone 750 MG Oral Tab 60 tablet 1     Sig: Take 1 tablet (750 mg total) by mouth 2 (two) times daily.      LF: 5/23/19 #60 TAB W/ 1 RF  LOV: 10/20/18   Future Appointments   Date Time P Bilirubin      0.1 - 2.0 mg/dL 0.4   TOTAL PROTEIN      6.4 - 8.2 g/dL 7.9   Albumin      3.4 - 5.0 g/dL 3.8   Globulin      2.8 - 4.4 g/dL 4.1   A/G Ratio      1.0 - 2.0 0.9 (L)   Patient Fasting?        Yes   Quantiferon TB NIL      IU/mL 0.03   Quantifer

## 2021-01-30 ENCOUNTER — OFFICE VISIT (OUTPATIENT)
Dept: DERMATOLOGY CLINIC | Facility: CLINIC | Age: 26
End: 2021-01-30
Payer: COMMERCIAL

## 2021-01-30 DIAGNOSIS — L70.0 ACNE VULGARIS: Primary | ICD-10-CM

## 2021-01-30 DIAGNOSIS — L30.9 DERMATITIS: ICD-10-CM

## 2021-01-30 PROCEDURE — 99213 OFFICE O/P EST LOW 20 MIN: CPT | Performed by: DERMATOLOGY

## 2021-01-30 RX ORDER — ADAPALENE 45 G/G
GEL TOPICAL
Qty: 45 G | Refills: 11 | Status: SHIPPED | OUTPATIENT
Start: 2021-01-30 | End: 2021-07-21

## 2021-01-30 RX ORDER — CLINDAMYCIN PHOSPHATE 10 MG/G
1 GEL TOPICAL 2 TIMES DAILY
Qty: 60 G | Refills: 3 | Status: SHIPPED | OUTPATIENT
Start: 2021-01-30 | End: 2021-07-21

## 2021-01-30 RX ORDER — BETAMETHASONE DIPROPIONATE 0.5 MG/G
CREAM TOPICAL
Qty: 50 G | Refills: 6 | Status: SHIPPED | OUTPATIENT
Start: 2021-01-30

## 2021-01-30 RX ORDER — LEVOCETIRIZINE DIHYDROCHLORIDE 5 MG/1
5 TABLET, FILM COATED ORAL EVERY EVENING
Qty: 30 TABLET | Refills: 3 | Status: SHIPPED | OUTPATIENT
Start: 2021-01-30 | End: 2021-03-28

## 2021-01-30 NOTE — PROGRESS NOTES
Liset Zuniga is a 23year old female. Patient presents with:  Acne: established pt, presents for 18 months F/U for facial acne, needs refills on adapalene and cleocin gels. \"They work well\".  Pt also needs levocetirizine that she takes PRN for MG Oral Tab Take 325 mg by mouth every 6 (six) hours as needed. Allergies:   No Known Allergies    Past Medical History:   Diagnosis Date   • Acne    • Pseudomonas infection     per NG;  Hx of pseudomonas nail inf - F/U with Dr Flores Deep     Past Surgic Asked        Special Diet: Not Asked        Back Care: Not Asked        Exercise: Not Asked        Bike Helmet: Not Asked        Seat Belt: Not Asked        Self-Exams: Not Asked        Grew up on a farm: Not Asked        History of tanning: Not Asked examination: Patient  well-developed well-nourished, alert oriented in no acute distress.   Exam of involved, appropriate areas of skin performed, including scalp, head, neck, face,nails, hair, external eyes, including conjunctival mucosa, eyelids, lips, ex General skin care questions answered. Reassurance regarding benign skin lesions. Signs and symptoms of skin cancer, ABCDE's of melanoma briefly reviewed. Sunscreen use, sun protection, encouraged. Followup as noted in rtc or p.r.n.     The patient in

## 2021-02-26 ENCOUNTER — LAB ENCOUNTER (OUTPATIENT)
Dept: LAB | Facility: HOSPITAL | Age: 26
End: 2021-02-26
Attending: INTERNAL MEDICINE
Payer: COMMERCIAL

## 2021-02-26 ENCOUNTER — OFFICE VISIT (OUTPATIENT)
Dept: RHEUMATOLOGY | Facility: CLINIC | Age: 26
End: 2021-02-26
Payer: COMMERCIAL

## 2021-02-26 VITALS
WEIGHT: 201 LBS | RESPIRATION RATE: 16 BRPM | BODY MASS INDEX: 30.46 KG/M2 | HEIGHT: 68 IN | SYSTOLIC BLOOD PRESSURE: 118 MMHG | DIASTOLIC BLOOD PRESSURE: 87 MMHG | HEART RATE: 80 BPM

## 2021-02-26 DIAGNOSIS — R76.8 POSITIVE ANA (ANTINUCLEAR ANTIBODY): ICD-10-CM

## 2021-02-26 DIAGNOSIS — M79.10 MYALGIA: ICD-10-CM

## 2021-02-26 DIAGNOSIS — M25.50 POLYARTHRALGIA: Primary | ICD-10-CM

## 2021-02-26 DIAGNOSIS — M25.50 POLYARTHRALGIA: ICD-10-CM

## 2021-02-26 DIAGNOSIS — E55.9 VITAMIN D DEFICIENCY: ICD-10-CM

## 2021-02-26 LAB
ALBUMIN SERPL-MCNC: 4.2 G/DL (ref 3.4–5)
ALBUMIN/GLOB SERPL: 1.1 {RATIO} (ref 1–2)
ALP LIVER SERPL-CCNC: 82 U/L
ALT SERPL-CCNC: 28 U/L
ANION GAP SERPL CALC-SCNC: 3 MMOL/L (ref 0–18)
AST SERPL-CCNC: 15 U/L (ref 15–37)
BILIRUB SERPL-MCNC: 0.3 MG/DL (ref 0.1–2)
BUN BLD-MCNC: 12 MG/DL (ref 7–18)
BUN/CREAT SERPL: 11.8 (ref 10–20)
CALCIUM BLD-MCNC: 9.6 MG/DL (ref 8.5–10.1)
CHLORIDE SERPL-SCNC: 105 MMOL/L (ref 98–112)
CK SERPL-CCNC: 96 U/L
CO2 SERPL-SCNC: 31 MMOL/L (ref 21–32)
CREAT BLD-MCNC: 1.02 MG/DL
CRP SERPL-MCNC: <0.29 MG/DL (ref ?–0.3)
DEPRECATED RDW RBC AUTO: 40.9 FL (ref 35.1–46.3)
ERYTHROCYTE [DISTWIDTH] IN BLOOD BY AUTOMATED COUNT: 13 % (ref 11–15)
ERYTHROCYTE [SEDIMENTATION RATE] IN BLOOD: 9 MM/HR
GLOBULIN PLAS-MCNC: 3.9 G/DL (ref 2.8–4.4)
GLUCOSE BLD-MCNC: 76 MG/DL (ref 70–99)
HCT VFR BLD AUTO: 45.4 %
HGB BLD-MCNC: 14.9 G/DL
M PROTEIN MFR SERPL ELPH: 8.1 G/DL (ref 6.4–8.2)
MCH RBC QN AUTO: 28.6 PG (ref 26–34)
MCHC RBC AUTO-ENTMCNC: 32.8 G/DL (ref 31–37)
MCV RBC AUTO: 87.1 FL
OSMOLALITY SERPL CALC.SUM OF ELEC: 287 MOSM/KG (ref 275–295)
PATIENT FASTING Y/N/NP: NO
PLATELET # BLD AUTO: 309 10(3)UL (ref 150–450)
POTASSIUM SERPL-SCNC: 3.6 MMOL/L (ref 3.5–5.1)
RBC # BLD AUTO: 5.21 X10(6)UL
RHEUMATOID FACT SERPL-ACNC: <10 IU/ML (ref ?–15)
SODIUM SERPL-SCNC: 139 MMOL/L (ref 136–145)
TSI SER-ACNC: 0.6 MIU/ML (ref 0.36–3.74)
WBC # BLD AUTO: 10.3 X10(3) UL (ref 4–11)

## 2021-02-26 PROCEDURE — 86038 ANTINUCLEAR ANTIBODIES: CPT

## 2021-02-26 PROCEDURE — 86431 RHEUMATOID FACTOR QUANT: CPT

## 2021-02-26 PROCEDURE — 84443 ASSAY THYROID STIM HORMONE: CPT

## 2021-02-26 PROCEDURE — 85027 COMPLETE CBC AUTOMATED: CPT

## 2021-02-26 PROCEDURE — 3008F BODY MASS INDEX DOCD: CPT | Performed by: INTERNAL MEDICINE

## 2021-02-26 PROCEDURE — 80053 COMPREHEN METABOLIC PANEL: CPT

## 2021-02-26 PROCEDURE — 82306 VITAMIN D 25 HYDROXY: CPT

## 2021-02-26 PROCEDURE — 85652 RBC SED RATE AUTOMATED: CPT

## 2021-02-26 PROCEDURE — 82085 ASSAY OF ALDOLASE: CPT

## 2021-02-26 PROCEDURE — 99214 OFFICE O/P EST MOD 30 MIN: CPT | Performed by: INTERNAL MEDICINE

## 2021-02-26 PROCEDURE — 82550 ASSAY OF CK (CPK): CPT

## 2021-02-26 PROCEDURE — 86140 C-REACTIVE PROTEIN: CPT

## 2021-02-26 PROCEDURE — 36415 COLL VENOUS BLD VENIPUNCTURE: CPT

## 2021-02-26 PROCEDURE — 86200 CCP ANTIBODY: CPT

## 2021-02-26 PROCEDURE — 3074F SYST BP LT 130 MM HG: CPT | Performed by: INTERNAL MEDICINE

## 2021-02-26 PROCEDURE — 3079F DIAST BP 80-89 MM HG: CPT | Performed by: INTERNAL MEDICINE

## 2021-02-26 RX ORDER — HYDROXYCHLOROQUINE SULFATE 200 MG/1
200 TABLET, FILM COATED ORAL 2 TIMES DAILY
Qty: 60 TABLET | Refills: 1 | Status: SHIPPED | OUTPATIENT
Start: 2021-02-26 | End: 2021-03-28

## 2021-02-26 NOTE — PATIENT INSTRUCTIONS
1. Cont. nabumetone 750mg twice a day   2. Try hydroxychloroquine 200mg twice a day   3. Lab tests today   4. Return to clinic in 6-8 weeks. Text SUPPORT1 to 63211 to learn if you may be eligible for financial support with your medication(s).     Msg & Da problems  · signs and symptoms of liver injury like dark yellow or brown urine; general ill feeling or flu-like symptoms; light-colored stools; loss of appetite; nausea; right upper belly pain; unusually weak or tired; yellowing of the eyes or skin  · sign disease, vision problems  · G6PD deficiency  · heart disease  · history of irregular heartbeat  · if you often drink alcohol  · kidney disease  · liver disease  · porphyria  · psoriasis  · an unusual or allergic reaction to chloroquine, hydroxychloroquine,

## 2021-02-26 NOTE — PROGRESS NOTES
Quinton Orellana is a 23year old female who presents for Patient presents with: Follow - Up: Polyarthritis  Medication Follow-Up  Finger Pain  . HPI:     I had the pleasure of seeing Quinton Orellana on 8/17/2017 for evaluation.      She is a pleasa feels it's worse in periods but it's constant. This doesn't stop her from doing things. It feels sore and achy. She has redness over her cheeks - she was given rosacea meds   She gets sores on her hands - for dermatitis.  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 • acetaminophen 325 MG Oral Tab Take 325 mg by mouth every 6 (six) hours as needed.         Past Medical History:   Diagnosis Date   • Acne    • Pseudomonas kg)   BMI 30.56 kg/m²   HEENT: Clear oropharynx, no oral ulcers, EOM intact, clear sclear, PERRLA, pleasant, no acute distress, no CAD, no neck tendnerness, good ROM,   CVS: RRR, no murmurs  RS: CTAB, no crackles, no rhonchi  ABD: Soft Non tender, no HSM f PLATELET VOLUME      7.4 - 10.3 fL  10.1   Neutrophils %      %  50   Lymphocytes %      %  40   Monocytes %      %  7   Eosinophils %      %  2   Basophils %      %  1   Neutrophils Absolute      1.8 - 7.7 K/UL  3.8   Lymphocytes Absolute      1.0 - 4.0 K Count      150.0 - 450.0 10(3)uL  276.0   Prelim Neutrophil Abs      1.50 - 7.70 x10 (3) uL  4.44   Neutrophils Absolute      1.50 - 7.70 x10(3) uL  4.44   Lymphocytes Absolute      1.00 - 4.00 x10(3) uL  3.70   Monocytes Absolute      0.10 - 1.00 x10(3) u SQUAM EPI CELLS UR      /HPF  Few   WBC Urine      0 - 5 /HPF  3   RBC Urine      0 - 2 /HPF  4 (H)   Bacteria Urine      None Seen /HPF  Few (A)   CALCIUM OXALATE CRYSTALS URINE      None seen /HPF  Few   Cholesterol, Total      <200 mg/dL 269 (H)    HD nabumeton 750mg twice a day   rtc in 6- 8 weeks   - if rash gets wrose call dr. Yoli Mckeon    Summary:  1. Cont. nabumetone 750mg twice a day   2. Try hydroxychloroquine 200mg twice a day   3. Lab tests today   4. Return to clinic in 6-8 weeks.      Bonny Swanson

## 2021-02-27 ENCOUNTER — PATIENT MESSAGE (OUTPATIENT)
Dept: RHEUMATOLOGY | Facility: CLINIC | Age: 26
End: 2021-02-27

## 2021-02-27 NOTE — PROGRESS NOTES
Anayeli Ortega is a 23year old female who presents for Patient presents with: Follow - Up: Polyarthritis  Medication Follow-Up  Finger Pain  . HPI:     I had the pleasure of seeing Anayeli Ortega on 8/17/2017 for evaluation.      She is a pleasa feels it's worse in periods but it's constant. This doesn't stop her from doing things. It feels sore and achy. She has redness over her cheeks - she was given rosacea meds   She gets sores on her hands - for dermatitis.  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 • acetaminophen 325 MG Oral Tab Take 325 mg by mouth every 6 (six) hours as needed.         Past Medical History:   Diagnosis Date   • Acne    • Pseudomonas kg)   BMI 30.56 kg/m²   HEENT: EOM intact, clear sclear, PERRLA, pleasant, no acute distress, no CAD, no neck tendnerness, good ROM,   CVS: RRR, no murmurs  RS: CTAB, no crackles, no rhonchi  ABD: Soft Non tender,   Joint exam:   No Malar erythema    tende 50   Lymphocytes %      %  40   Monocytes %      %  7   Eosinophils %      %  2   Basophils %      %  1   Neutrophils Absolute      1.8 - 7.7 K/UL  3.8   Lymphocytes Absolute      1.0 - 4.0 K/UL  3.1   Monocytes Absolute      0.0 - 1.0 K/UL  0.5   Eosinoph Abs      1.50 - 7.70 x10 (3) uL  4.44   Neutrophils Absolute      1.50 - 7.70 x10(3) uL  4.44   Lymphocytes Absolute      1.00 - 4.00 x10(3) uL  3.70   Monocytes Absolute      0.10 - 1.00 x10(3) uL  0.63   Eosinophils Absolute      0.00 - 0.70 x10(3) uL  0 3   RBC Urine      0 - 2 /HPF  4 (H)   Bacteria Urine      None Seen /HPF  Few (A)   CALCIUM OXALATE CRYSTALS URINE      None seen /HPF  Few   Cholesterol, Total      <200 mg/dL 269 (H)    HDL Cholesterol      40 - 59 mg/dL 45    Triglycerides      30 - 14 gets wrose call dr. Basim Padilla    Summary:  1. Cont. nabumetone 750mg twice a day   2. Try hydroxychloroquine 200mg twice a day   3. Lab tests today   4. Return to clinic in 6-8 weeks.      Edwige Carbajal MD  2/26/2021   3:45 PM  - Reviewed [facility] informat

## 2021-03-01 LAB
25(OH)D3 SERPL-MCNC: 13.5 NG/ML (ref 30–100)
ALDOLASE, SERUM: 7.2 U/L
NUCLEAR IGG TITR SER IF: NEGATIVE {TITER}

## 2021-03-01 RX ORDER — NABUMETONE 750 MG/1
750 TABLET, FILM COATED ORAL 2 TIMES DAILY
Qty: 60 TABLET | Refills: 5 | Status: SHIPPED | OUTPATIENT
Start: 2021-03-01 | End: 2021-05-06

## 2021-03-01 NOTE — TELEPHONE ENCOUNTER
From: Quinton Orellana  To: Telma Malone MD  Sent: 2/27/2021 2:50 PM CST  Subject: Prescription Question    Hello,    My pharmacy informed that I needed to put in a prescription refill request for my Nambumetone 750 MG tablet.      Thank you     Mi

## 2021-03-01 NOTE — TELEPHONE ENCOUNTER
Last filled: 5/23/19 #60 tab with 1 refill  LOV: 2/26/21  Future Appointments   Date Time Provider Keira Hudson   5/6/2021  3:10 PM MD Tim Osorio. Liz Winters [address] [facility] [place]     Labs: 2/26/21    Please advise.

## 2021-03-02 LAB — CCP IGG SERPL-ACNC: 0.5 U/ML (ref 0–6.9)

## 2021-03-03 ENCOUNTER — PATIENT MESSAGE (OUTPATIENT)
Dept: RHEUMATOLOGY | Facility: CLINIC | Age: 26
End: 2021-03-03

## 2021-03-03 RX ORDER — ERGOCALCIFEROL (VITAMIN D2) 1250 MCG
50000 CAPSULE ORAL WEEKLY
Qty: 12 CAPSULE | Refills: 0 | Status: SHIPPED | OUTPATIENT
Start: 2021-03-03 | End: 2021-06-01

## 2021-03-03 NOTE — TELEPHONE ENCOUNTER
From: Darryl Patel  To: Janice Hudson MD  Sent: 3/3/2021 2:44 PM CST  Subject: Prescription Question    Hello I had a question about the Vitamin D.     While taking the vitamin d 50,000 units once a week should I also be taking the over the coun

## 2021-03-29 RX ORDER — LEVOCETIRIZINE DIHYDROCHLORIDE 5 MG/1
5 TABLET, FILM COATED ORAL EVERY EVENING
Qty: 30 TABLET | Refills: 3 | Status: SHIPPED | OUTPATIENT
Start: 2021-03-29 | End: 2021-07-21

## 2021-03-29 RX ORDER — NABUMETONE 750 MG/1
750 TABLET, FILM COATED ORAL 2 TIMES DAILY
Qty: 60 TABLET | Refills: 5 | Status: CANCELLED | OUTPATIENT
Start: 2021-03-29

## 2021-03-29 RX ORDER — HYDROXYCHLOROQUINE SULFATE 200 MG/1
200 TABLET, FILM COATED ORAL 2 TIMES DAILY
Qty: 60 TABLET | Refills: 1 | Status: SHIPPED | OUTPATIENT
Start: 2021-03-29 | End: 2021-05-06

## 2021-03-29 NOTE — TELEPHONE ENCOUNTER
LOV: 2/26/21  Last Refilled:#60, 1rf  2/26/21    Future Appointments   Date Time Provider Keira Hudson   5/6/2021  3:10 PM Yoel Mcrae MD [facility] [place] [facility] [place]       Please advise.

## 2021-05-07 RX ORDER — HYDROXYCHLOROQUINE SULFATE 200 MG/1
200 TABLET, FILM COATED ORAL 2 TIMES DAILY
Qty: 60 TABLET | Refills: 1 | Status: SHIPPED | OUTPATIENT
Start: 2021-05-07

## 2021-05-07 RX ORDER — NABUMETONE 750 MG/1
750 TABLET, FILM COATED ORAL 2 TIMES DAILY
Qty: 60 TABLET | Refills: 5 | Status: SHIPPED | OUTPATIENT
Start: 2021-05-07

## 2021-05-07 NOTE — TELEPHONE ENCOUNTER
Requested Prescriptions     Pending Prescriptions Disp Refills   • Nabumetone 750 MG Oral Tab 60 tablet 5     Sig: Take 1 tablet (750 mg total) by mouth 2 (two) times daily.    • Hydroxychloroquine Sulfate 200 MG Oral Tab 60 tablet 1     Sig: Take 1 tablet FACTOR      <15 IU/mL <10   TSH      0.358 - 3.740 mIU/mL 0.599   Vitamin D, 25OH, Total      30.0 - 100.0 ng/mL 13.5 (L)       Summary:  1. Cont. nabumetone 750mg twice a day   2. Try hydroxychloroquine 200mg twice a day   3. Lab tests today   4.  Return t

## 2021-05-25 NOTE — Clinical Note
Spoke with pt today for TCM--pt declines sooner appt than 8/28/2023--prefers to complete daily IV abx x 2 weeks, prior to appt. Sent appt TE to office staff, as FYI/TCM protocol--thank you.
[FreeTextEntry1] : 68-year-old -American female with obesity HIV and chronic hepatitis B.  Patient has been 10 years on Entyvio for suppression therapy of chronic hepatitis B.  Was last seen here 3/19/2019.  Patient never had decompensated liver disease.  Patient had met appropriate criteria for therapy of hepatitis B and has been on therapy with Baraclude for the past 10 years.  She is on 1 mg/day.  She is very compliant with her medical regimen.  Recent blood work done 5/1/2021 shows the CBC and CMP to be normal LFTs are normal.  Hepatitis B surface antigen is still positive hepatitis B virus is not detectable by PCR.  Last colonoscopy was in 2017 with Dr. Hill and Payal.  Currently asymptomatic.  No weight loss.  Appears robust and remains overweight.  Patient is HIV positive and she is on chronic HIV medications which also are apparently working well.  No complaints.

## 2021-05-27 ENCOUNTER — TELEPHONE (OUTPATIENT)
Dept: RHEUMATOLOGY | Facility: CLINIC | Age: 26
End: 2021-05-27

## 2021-05-27 ENCOUNTER — OFFICE VISIT (OUTPATIENT)
Dept: RHEUMATOLOGY | Facility: CLINIC | Age: 26
End: 2021-05-27
Payer: COMMERCIAL

## 2021-05-27 VITALS
SYSTOLIC BLOOD PRESSURE: 110 MMHG | HEIGHT: 68 IN | HEART RATE: 96 BPM | BODY MASS INDEX: 31.37 KG/M2 | WEIGHT: 207 LBS | DIASTOLIC BLOOD PRESSURE: 77 MMHG

## 2021-05-27 DIAGNOSIS — M79.10 MYALGIA: ICD-10-CM

## 2021-05-27 DIAGNOSIS — M25.50 POLYARTHRALGIA: Primary | ICD-10-CM

## 2021-05-27 DIAGNOSIS — E55.9 VITAMIN D DEFICIENCY: ICD-10-CM

## 2021-05-27 PROCEDURE — 99214 OFFICE O/P EST MOD 30 MIN: CPT | Performed by: INTERNAL MEDICINE

## 2021-05-27 PROCEDURE — 3078F DIAST BP <80 MM HG: CPT | Performed by: INTERNAL MEDICINE

## 2021-05-27 PROCEDURE — 3074F SYST BP LT 130 MM HG: CPT | Performed by: INTERNAL MEDICINE

## 2021-05-27 PROCEDURE — 3008F BODY MASS INDEX DOCD: CPT | Performed by: INTERNAL MEDICINE

## 2021-05-27 NOTE — TELEPHONE ENCOUNTER
Called [employer] - per answering machine, MRI of right hand PA is required for this patient and referred to [address] specialty at - [phone] to initiate PA over the phone. Completed info and said requires further review.  Asked to review the magy

## 2021-05-27 NOTE — PROGRESS NOTES
Kelli Gonzalez is a 27year old female who presents for Patient presents with: Inflammatory Polyarthropathy  . HPI:     I had the pleasure of seeing Kelli Gonzalez on 8/17/2017 for evaluation.      She is a pleasant 23year old who has polyarthal constant. This doesn't stop her from doing things. It feels sore and achy. She has redness over her cheeks - she was given rosacea meds   She gets sores on her hands - for dermatitis.  - in 9/2018 - she had porphyria like blisters - it started 2 years ago Nabumetone 750 MG Oral Tab Take 1 tablet (750 mg total) by mouth 2 (two) times daily. 60 tablet 5   • Hydroxychloroquine Sulfate 200 MG Oral Tab Take 1 tablet (200 mg total) by mouth 2 (two) times daily.  60 tablet 1   • Levocetirizine Dihydrochloride 5 MG and purple, , no nasal ulcers, no parotid swelling, no neck pain, no jaw pain, no temple pain  Gets post nasal drip,   Eyes: No visual changes,   CVS: No chest pain, no heart disease  RS: No SOB, no Cough, No Pleurtic pain,   Exercising she can have dick, U/L  62   Total Bilirubin      0.3 - 1.2 mg/dL  0.3   TOTAL PROTEIN      5.9 - 8.4 g/dL  7.1   Albumin      3.5 - 4.8 g/dL  4.0   GLOBULIN, TOTAL      2.5 - 3.7 g/dL  3.1   A/G RATIO      1.0 - 2.0  1.3   ANION GAP      0 - 18 mmol/L  8   BUN/CREA RATIO %  7 (L)   TRANSFERRIN      192 - 382 mg/dL  291   FERRITIN      11 - 307 ng/mL  6 (L)   C-REACTIVE PROTEIN      0.0 - 0.9 mg/dL 0.7    SED RATE      0 - 20 mm/Hr 9    CK      38 - 234 U/L 98    ALDOLASE, SERUM      1.5 - 8.1 U/L 5.8    LDH      98 - 192 U Component      Latest Ref Rng & Units 10/22/2018   [id number]-[id number]      Negative Negative     8/17/2017 - bl hand xray - CONCLUSION: Normal examination.       11/4/2018 - right hand xray - normal    11/4/2018  - mri right hand   Unremarkable right hand MRI with

## 2021-05-27 NOTE — PATIENT INSTRUCTIONS
1. Cont. nabumetone 750mg twice a day   2. Ok to STOP - hydroxychloroquine   3. Check right hand xray and right hand mri   4. If showing rheumatoid than can treat for RA, if not then can use other medications. 5. Return to clinic in 4-6 weeks.

## 2021-06-01 ENCOUNTER — HOSPITAL ENCOUNTER (OUTPATIENT)
Dept: GENERAL RADIOLOGY | Age: 26
Discharge: HOME | End: 2021-06-01
Attending: INTERNAL MEDICINE
Payer: COMMERCIAL

## 2021-06-01 DIAGNOSIS — M25.50 POLYARTHRALGIA: ICD-10-CM

## 2021-06-01 PROCEDURE — 73130 X-RAY EXAM OF HAND: CPT | Performed by: INTERNAL MEDICINE

## 2021-06-01 NOTE — TELEPHONE ENCOUNTER
Please ask pt.  To complete her right hand xray that was ordered - we always try to get this done first before initialing a PA

## 2021-06-08 ENCOUNTER — TELEPHONE (OUTPATIENT)
Dept: RHEUMATOLOGY | Facility: CLINIC | Age: 26
End: 2021-06-08

## 2021-06-08 NOTE — TELEPHONE ENCOUNTER
Denial   MRI right hand     Health plan has denied request as criteria not met. You may reach out to the health plan at [phone]. Also, please reach out to the patient with plan of care.      Thank you, Ronnie

## 2021-06-18 NOTE — TELEPHONE ENCOUNTER
Dr. Gabriel Jones, MRI has been denied. Please advise. TE 6/8/21 Denial   MRI right hand      Health plan has denied request as criteria not met. You may reach out to the health plan at [phone].  Also, please reach out to the patient with plan of care.      Rosa Singh

## 2021-07-22 RX ORDER — ADAPALENE 45 G/G
GEL TOPICAL
Qty: 45 G | Refills: 5 | Status: SHIPPED | OUTPATIENT
Start: 2021-07-22

## 2021-07-22 RX ORDER — CLINDAMYCIN PHOSPHATE 10 MG/G
1 GEL TOPICAL 2 TIMES DAILY
Qty: 60 G | Refills: 5 | Status: SHIPPED | OUTPATIENT
Start: 2021-07-22

## 2021-07-22 RX ORDER — LEVOCETIRIZINE DIHYDROCHLORIDE 5 MG/1
5 TABLET, FILM COATED ORAL EVERY EVENING
Qty: 30 TABLET | Refills: 6 | Status: SHIPPED | OUTPATIENT
Start: 2021-07-22

## 2021-07-28 ENCOUNTER — OFFICE VISIT (OUTPATIENT)
Dept: DERMATOLOGY CLINIC | Facility: CLINIC | Age: 26
End: 2021-07-28
Payer: COMMERCIAL

## 2021-07-28 DIAGNOSIS — T14.8XXA HEMATOMA: Primary | ICD-10-CM

## 2021-07-28 PROCEDURE — 99213 OFFICE O/P EST LOW 20 MIN: CPT | Performed by: PHYSICIAN ASSISTANT

## 2021-07-28 NOTE — PROGRESS NOTES
HPI:    Patient ID: Ruby Sprain is a 27year old female. Patient presents for mole on her left great toe. No draining. No pain. Has noted that it was very small and has grown in the past few months. No allergies to medications noted.  This is the and time. ASSESSMENT/PLAN:   1. Hematoma  -After discussion with patient, advised the following:  -Told to watch for now. -Benign in nature.    -Return if changing.   -To call or follow-up with worsening symptoms or concerns.   -Pt was agre

## 2021-09-20 ENCOUNTER — OFFICE VISIT (OUTPATIENT)
Dept: PODIATRY CLINIC | Facility: CLINIC | Age: 26
End: 2021-09-20
Payer: COMMERCIAL

## 2021-09-20 VITALS — SYSTOLIC BLOOD PRESSURE: 119 MMHG | DIASTOLIC BLOOD PRESSURE: 83 MMHG | HEART RATE: 80 BPM

## 2021-09-20 DIAGNOSIS — L60.1 ONYCHOLYSIS: ICD-10-CM

## 2021-09-20 DIAGNOSIS — B35.1 ONYCHOMYCOSIS: Primary | ICD-10-CM

## 2021-09-20 PROCEDURE — 11750 EXCISION NAIL&NAIL MATRIX: CPT | Performed by: STUDENT IN AN ORGANIZED HEALTH CARE EDUCATION/TRAINING PROGRAM

## 2021-09-20 PROCEDURE — 3079F DIAST BP 80-89 MM HG: CPT | Performed by: STUDENT IN AN ORGANIZED HEALTH CARE EDUCATION/TRAINING PROGRAM

## 2021-09-20 PROCEDURE — 3074F SYST BP LT 130 MM HG: CPT | Performed by: STUDENT IN AN ORGANIZED HEALTH CARE EDUCATION/TRAINING PROGRAM

## 2021-09-20 PROCEDURE — 99203 OFFICE O/P NEW LOW 30 MIN: CPT | Performed by: STUDENT IN AN ORGANIZED HEALTH CARE EDUCATION/TRAINING PROGRAM

## 2021-09-20 NOTE — PROGRESS NOTES
Per Dr Tony Whitaker request was draw 1 syringe with 2.5 ml Lidocaine 1% and 2.5 ml Marcaine 0.5% for this patient left big toe. Ludivina Zendejas  Soaking instructions provided. No vitals taken after the procedure.

## 2021-09-20 NOTE — PROGRESS NOTES
[facility], [facility]  Progress Note    Elian Magana is a 27year old female.  Patient presents with:  Toenail Fungus: Left big toe - had a chair fell on it few days ago and was bleeding - now is painful and black colored - rates pain as 4-6/10 on Past Medical History:   Diagnosis Date   • Acne    • Pseudomonas infection     per NG;  Hx of pseudomonas nail inf - F/U with Dr Nasra Pineda      Past Surgical History:   Procedure Laterality Date   • KNEE SURGERY Left       Family History   Problem Relation foot and ankle. Mild pain on palpation of the left hallux nail. No pain to left hallux or with left hallux range of motion.    4. Neurological: Normal sharp dull sensation; reflexes normal.          ASSESSMENT AND PLAN:   Diagnoses and all orders for this immediate medical attention if any concerns arise. The patient indicates understanding of these issues and agrees to the plan. Return in about 2 weeks (around 10/4/2021) for f/u nail matrixectomy .     Alexandra Blount DPM  9/20/2021

## 2021-09-20 NOTE — PATIENT INSTRUCTIONS
-Perform soaking and aftercare instructions as advised. -Monitor site for acute signs of infection and seek immediate medical attention if any acute signs of infection arise.  -Follow-up in 2 weeks for reevaluation.

## 2021-10-11 ENCOUNTER — OFFICE VISIT (OUTPATIENT)
Dept: PODIATRY CLINIC | Facility: CLINIC | Age: 26
End: 2021-10-11
Payer: COMMERCIAL

## 2021-10-11 DIAGNOSIS — L60.1 ONYCHOLYSIS: Primary | ICD-10-CM

## 2021-10-11 PROCEDURE — 99213 OFFICE O/P EST LOW 20 MIN: CPT | Performed by: STUDENT IN AN ORGANIZED HEALTH CARE EDUCATION/TRAINING PROGRAM

## 2021-10-11 NOTE — PATIENT INSTRUCTIONS
-No need for further soaks or dressing changes at this time.  -Follow-up as needed if pain or acute signs of infection arise in the future.

## 2021-10-11 NOTE — PROGRESS NOTES
[facility], [facility]  Progress Note    Sirena Zhang is a 27year old female.  Patient presents with:  Toenail Fungus: Left big toe -2 weeks f/u after total matrixectomy - states she is fine, has no more drainage, no pain ,no redness        HPI: • Asthma Brother    • Arthritis Maternal Grandmother         Rheumatoid Arthritis   • Diabetes Maternal Grandfather    • Diabetes Paternal Grandfather    • Arthritis Maternal Uncle         Rheumatoid Arthritis      Social History    Socioeconomic History time.  -No need for further dressing changes or soaks at this time.  -Patient should monitor site.   If nail does regrow in the future, can consider surgical matrixectomy.  -Educated patient on acute signs of infection advised patient to seek immediate medi

## 2021-10-29 NOTE — TELEPHONE ENCOUNTER
S/W pt and determined that she still has mid burning with urination but she can live with it and she no longer has loose stools and she  will just wait to hear about the final culture results. Waiting on quantiferone results and then pt will be notified.

## 2022-02-28 ENCOUNTER — OFFICE VISIT (OUTPATIENT)
Dept: INTERNAL MEDICINE CLINIC | Facility: CLINIC | Age: 27
End: 2022-02-28
Payer: COMMERCIAL

## 2022-02-28 ENCOUNTER — HOSPITAL ENCOUNTER (OUTPATIENT)
Dept: GENERAL RADIOLOGY | Age: 27
Discharge: HOME | End: 2022-02-28
Attending: INTERNAL MEDICINE
Payer: COMMERCIAL

## 2022-02-28 VITALS
WEIGHT: 225 LBS | DIASTOLIC BLOOD PRESSURE: 81 MMHG | HEIGHT: 68 IN | BODY MASS INDEX: 34.1 KG/M2 | SYSTOLIC BLOOD PRESSURE: 121 MMHG | HEART RATE: 98 BPM

## 2022-02-28 DIAGNOSIS — M79.642 LEFT HAND PAIN: ICD-10-CM

## 2022-02-28 DIAGNOSIS — M79.642 LEFT HAND PAIN: Primary | ICD-10-CM

## 2022-02-28 PROCEDURE — 3074F SYST BP LT 130 MM HG: CPT | Performed by: INTERNAL MEDICINE

## 2022-02-28 PROCEDURE — 99213 OFFICE O/P EST LOW 20 MIN: CPT | Performed by: INTERNAL MEDICINE

## 2022-02-28 PROCEDURE — 3008F BODY MASS INDEX DOCD: CPT | Performed by: INTERNAL MEDICINE

## 2022-02-28 PROCEDURE — 3079F DIAST BP 80-89 MM HG: CPT | Performed by: INTERNAL MEDICINE

## 2022-02-28 PROCEDURE — 73130 X-RAY EXAM OF HAND: CPT | Performed by: INTERNAL MEDICINE

## 2023-08-07 ENCOUNTER — APPOINTMENT (OUTPATIENT)
Dept: CT IMAGING | Facility: HOSPITAL | Age: 28
End: 2023-08-07
Attending: NURSE PRACTITIONER
Payer: COMMERCIAL

## 2023-08-07 ENCOUNTER — APPOINTMENT (OUTPATIENT)
Dept: CT IMAGING | Facility: HOSPITAL | Age: 28
End: 2023-08-07
Attending: EMERGENCY MEDICINE
Payer: COMMERCIAL

## 2023-08-07 ENCOUNTER — HOSPITAL ENCOUNTER (INPATIENT)
Facility: HOSPITAL | Age: 28
LOS: 4 days | Discharge: HOME | End: 2023-08-11
Attending: EMERGENCY MEDICINE | Admitting: HOSPITALIST
Payer: COMMERCIAL

## 2023-08-07 DIAGNOSIS — K35.32 RUPTURED APPENDICITIS: Primary | ICD-10-CM

## 2023-08-07 DIAGNOSIS — K35.32 PERFORATED APPENDICITIS: ICD-10-CM

## 2023-08-07 LAB
ALBUMIN SERPL-MCNC: 3.4 G/DL (ref 3.4–5)
ALBUMIN/GLOB SERPL: 0.7 {RATIO} (ref 1–2)
ALP LIVER SERPL-CCNC: 82 U/L
ALT SERPL-CCNC: 20 U/L
ANION GAP SERPL CALC-SCNC: 10 MMOL/L (ref 0–18)
AST SERPL-CCNC: 12 U/L (ref 15–37)
B-HCG UR QL: NEGATIVE
BASOPHILS # BLD AUTO: 0.04 X10(3) UL (ref 0–0.2)
BASOPHILS NFR BLD AUTO: 0.2 %
BILIRUB SERPL-MCNC: 1 MG/DL (ref 0.1–2)
BILIRUB UR QL: NEGATIVE
BUN BLD-MCNC: 16 MG/DL (ref 7–18)
BUN/CREAT SERPL: 14.4 (ref 10–20)
CALCIUM BLD-MCNC: 9.2 MG/DL (ref 8.5–10.1)
CHLORIDE SERPL-SCNC: 104 MMOL/L (ref 98–112)
CO2 SERPL-SCNC: 25 MMOL/L (ref 21–32)
COLOR UR: YELLOW
CREAT BLD-MCNC: 1.11 MG/DL
DEPRECATED RDW RBC AUTO: 42.2 FL (ref 35.1–46.3)
EGFRCR SERPLBLD CKD-EPI 2021: 70 ML/MIN/1.73M2 (ref 60–?)
EOSINOPHIL # BLD AUTO: 0 X10(3) UL (ref 0–0.7)
EOSINOPHIL NFR BLD AUTO: 0 %
ERYTHROCYTE [DISTWIDTH] IN BLOOD BY AUTOMATED COUNT: 13.4 % (ref 11–15)
GLOBULIN PLAS-MCNC: 4.8 G/DL (ref 2.8–4.4)
GLUCOSE BLD-MCNC: 116 MG/DL (ref 70–99)
GLUCOSE UR-MCNC: NORMAL MG/DL
HCT VFR BLD AUTO: 38.5 %
HGB BLD-MCNC: 13.1 G/DL
IMM GRANULOCYTES # BLD AUTO: 0.15 X10(3) UL (ref 0–1)
IMM GRANULOCYTES NFR BLD: 0.7 %
KETONES UR-MCNC: 40 MG/DL
LACTATE SERPL-SCNC: 0.7 MMOL/L (ref 0.4–2)
LEUKOCYTE ESTERASE UR QL STRIP.AUTO: NEGATIVE
LIPASE SERPL-CCNC: 20 U/L (ref 13–75)
LYMPHOCYTES # BLD AUTO: 1.77 X10(3) UL (ref 1–4)
LYMPHOCYTES NFR BLD AUTO: 8.8 %
MCH RBC QN AUTO: 29.3 PG (ref 26–34)
MCHC RBC AUTO-ENTMCNC: 34 G/DL (ref 31–37)
MCV RBC AUTO: 86.1 FL
MONOCYTES # BLD AUTO: 1.47 X10(3) UL (ref 0.1–1)
MONOCYTES NFR BLD AUTO: 7.3 %
NEUTROPHILS # BLD AUTO: 16.79 X10 (3) UL (ref 1.5–7.7)
NEUTROPHILS # BLD AUTO: 16.79 X10(3) UL (ref 1.5–7.7)
NEUTROPHILS NFR BLD AUTO: 83 %
NITRITE UR QL STRIP.AUTO: NEGATIVE
OSMOLALITY SERPL CALC.SUM OF ELEC: 290 MOSM/KG (ref 275–295)
PH UR: 5.5 [PH] (ref 5–8)
PLATELET # BLD AUTO: 242 10(3)UL (ref 150–450)
POTASSIUM SERPL-SCNC: 3.4 MMOL/L (ref 3.5–5.1)
PROT SERPL-MCNC: 8.2 G/DL (ref 6.4–8.2)
PROT UR-MCNC: 50 MG/DL
RBC # BLD AUTO: 4.47 X10(6)UL
SARS-COV-2 RNA RESP QL NAA+PROBE: NOT DETECTED
SODIUM SERPL-SCNC: 139 MMOL/L (ref 136–145)
SP GR UR STRIP: 1.03 (ref 1–1.03)
UROBILINOGEN UR STRIP-ACNC: 2
WBC # BLD AUTO: 20.2 X10(3) UL (ref 4–11)

## 2023-08-07 PROCEDURE — 99222 1ST HOSP IP/OBS MODERATE 55: CPT | Performed by: HOSPITALIST

## 2023-08-07 PROCEDURE — 72192 CT PELVIS W/O DYE: CPT | Performed by: NURSE PRACTITIONER

## 2023-08-07 PROCEDURE — 99223 1ST HOSP IP/OBS HIGH 75: CPT | Performed by: SURGERY

## 2023-08-07 PROCEDURE — 74177 CT ABD & PELVIS W/CONTRAST: CPT | Performed by: EMERGENCY MEDICINE

## 2023-08-07 RX ORDER — HYDROCODONE BITARTRATE AND ACETAMINOPHEN 5; 325 MG/1; MG/1
2 TABLET ORAL EVERY 4 HOURS PRN
Status: DISCONTINUED | OUTPATIENT
Start: 2023-08-07 | End: 2023-08-11

## 2023-08-07 RX ORDER — NALOXONE HYDROCHLORIDE 0.4 MG/ML
0.08 INJECTION, SOLUTION INTRAMUSCULAR; INTRAVENOUS; SUBCUTANEOUS
Status: DISCONTINUED | OUTPATIENT
Start: 2023-08-07 | End: 2023-08-10

## 2023-08-07 RX ORDER — SODIUM CHLORIDE 9 MG/ML
INJECTION, SOLUTION INTRAVENOUS CONTINUOUS
Status: DISCONTINUED | OUTPATIENT
Start: 2023-08-07 | End: 2023-08-10

## 2023-08-07 RX ORDER — TEMAZEPAM 15 MG/1
15 CAPSULE ORAL NIGHTLY PRN
Status: DISCONTINUED | OUTPATIENT
Start: 2023-08-07 | End: 2023-08-11

## 2023-08-07 RX ORDER — KETOROLAC TROMETHAMINE 15 MG/ML
15 INJECTION, SOLUTION INTRAMUSCULAR; INTRAVENOUS ONCE
Status: COMPLETED | OUTPATIENT
Start: 2023-08-07 | End: 2023-08-07

## 2023-08-07 RX ORDER — MORPHINE SULFATE 4 MG/ML
4 INJECTION, SOLUTION INTRAMUSCULAR; INTRAVENOUS EVERY 2 HOUR PRN
Status: DISCONTINUED | OUTPATIENT
Start: 2023-08-07 | End: 2023-08-10

## 2023-08-07 RX ORDER — NALOXONE HYDROCHLORIDE 0.4 MG/ML
80 INJECTION, SOLUTION INTRAMUSCULAR; INTRAVENOUS; SUBCUTANEOUS AS NEEDED
Status: DISCONTINUED | OUTPATIENT
Start: 2023-08-07 | End: 2023-08-07

## 2023-08-07 RX ORDER — MORPHINE SULFATE 2 MG/ML
1 INJECTION, SOLUTION INTRAMUSCULAR; INTRAVENOUS EVERY 2 HOUR PRN
Status: DISCONTINUED | OUTPATIENT
Start: 2023-08-07 | End: 2023-08-10

## 2023-08-07 RX ORDER — MORPHINE SULFATE 2 MG/ML
2 INJECTION, SOLUTION INTRAMUSCULAR; INTRAVENOUS EVERY 2 HOUR PRN
Status: DISCONTINUED | OUTPATIENT
Start: 2023-08-07 | End: 2023-08-10

## 2023-08-07 RX ORDER — HYDROCODONE BITARTRATE AND ACETAMINOPHEN 5; 325 MG/1; MG/1
1 TABLET ORAL EVERY 4 HOURS PRN
Status: DISCONTINUED | OUTPATIENT
Start: 2023-08-07 | End: 2023-08-11

## 2023-08-07 RX ORDER — ONDANSETRON 2 MG/ML
4 INJECTION INTRAMUSCULAR; INTRAVENOUS EVERY 6 HOURS PRN
Status: DISCONTINUED | OUTPATIENT
Start: 2023-08-07 | End: 2023-08-10

## 2023-08-07 RX ORDER — ACETAMINOPHEN 325 MG/1
650 TABLET ORAL EVERY 4 HOURS PRN
Status: DISCONTINUED | OUTPATIENT
Start: 2023-08-07 | End: 2023-08-11

## 2023-08-07 RX ORDER — MIDAZOLAM HYDROCHLORIDE 1 MG/ML
INJECTION INTRAMUSCULAR; INTRAVENOUS
Status: DISCONTINUED
Start: 2023-08-07 | End: 2023-08-07 | Stop reason: WASHOUT

## 2023-08-07 RX ORDER — FLUMAZENIL 0.1 MG/ML
0.2 INJECTION INTRAVENOUS AS NEEDED
Status: DISCONTINUED | OUTPATIENT
Start: 2023-08-07 | End: 2023-08-07

## 2023-08-07 RX ORDER — PROCHLORPERAZINE EDISYLATE 5 MG/ML
5 INJECTION INTRAMUSCULAR; INTRAVENOUS EVERY 8 HOURS PRN
Status: DISCONTINUED | OUTPATIENT
Start: 2023-08-07 | End: 2023-08-11

## 2023-08-07 RX ORDER — ACETAMINOPHEN 500 MG
500 TABLET ORAL EVERY 4 HOURS PRN
Status: DISCONTINUED | OUTPATIENT
Start: 2023-08-07 | End: 2023-08-11

## 2023-08-07 RX ORDER — MIDAZOLAM HYDROCHLORIDE 1 MG/ML
1 INJECTION INTRAMUSCULAR; INTRAVENOUS EVERY 5 MIN PRN
Status: DISCONTINUED | OUTPATIENT
Start: 2023-08-07 | End: 2023-08-07

## 2023-08-07 RX ORDER — ACETAMINOPHEN 10 MG/ML
1000 INJECTION, SOLUTION INTRAVENOUS EVERY 6 HOURS PRN
Status: DISCONTINUED | OUTPATIENT
Start: 2023-08-07 | End: 2023-08-11

## 2023-08-07 RX ORDER — MORPHINE SULFATE 4 MG/ML
4 INJECTION, SOLUTION INTRAMUSCULAR; INTRAVENOUS ONCE
Status: COMPLETED | OUTPATIENT
Start: 2023-08-07 | End: 2023-08-07

## 2023-08-07 RX ORDER — ONDANSETRON 2 MG/ML
4 INJECTION INTRAMUSCULAR; INTRAVENOUS EVERY 6 HOURS PRN
Status: DISCONTINUED | OUTPATIENT
Start: 2023-08-07 | End: 2023-08-07

## 2023-08-07 RX ORDER — ONDANSETRON 2 MG/ML
4 INJECTION INTRAMUSCULAR; INTRAVENOUS ONCE
Status: COMPLETED | OUTPATIENT
Start: 2023-08-07 | End: 2023-08-07

## 2023-08-07 RX ORDER — DIPHENHYDRAMINE HYDROCHLORIDE 50 MG/ML
12.5 INJECTION INTRAMUSCULAR; INTRAVENOUS EVERY 4 HOURS PRN
Status: DISCONTINUED | OUTPATIENT
Start: 2023-08-07 | End: 2023-08-10

## 2023-08-07 RX ORDER — ACETAMINOPHEN 500 MG
TABLET ORAL EVERY 6 HOURS PRN
COMMUNITY
End: 2023-08-11

## 2023-08-07 NOTE — ED INITIAL ASSESSMENT (HPI)
To ED with c/o pain to right lower abd and emesis for the past 3 days. Patient states she has not been able to urinate much and have bowel movements d/t multiple episodes of emesis.

## 2023-08-07 NOTE — H&P
[facility]    PATIENT'S NAME: Brianne CHURCH   ATTENDING PHYSICIAN: Selena Mcmahon MD   PATIENT ACCOUNT#:   [id number]    LOCATION:  [address]  MEDICAL RECORD #:   [id number]       YOB: 1995  ADMISSION DATE:       08/07/2023    HISTORY AND PHYSICAL EXAMINATION    CHIEF COMPLAINT:  Perforated appendicitis. HISTORY OF PRESENT ILLNESS:  The patient is a 28-year-old  female who came into the emergency department with intense right lower quadrant abdominal pain for the last 2 to 3 days. CBC showed white blood cell count of 20.2 with left shift. Chemistry showed potassium 3.4, sodium 139. Otherwise, liver function tests and chemistry unremarkable. Urinalysis showed turbid urine but negative for leukocyte esterase. CT scan of the abdomen showed acute perforated appendicitis, partially loculated fluid and gas collection without mature thick walled appearance at present, concerning for developing periappendiceal abscess. The patient was started on IV antibiotics, and she will be admitted to the hospital for further management. PAST MEDICAL HISTORY:  The patient denies any medical problems. PAST SURGICAL HISTORY:  Left knee tendon repair. ALLERGIES:  No known drug allergies. FAMILY HISTORY:  Mother had non-Hodgkin's lymphoma requiring bone marrow transplant. SOCIAL HISTORY:  No tobacco, alcohol, or drug use. Lives with her family. . Usually independent for basic activities of daily living. REVIEW OF SYSTEMS:  The patient reports pain coincides with her period around 3 days ago. Initially, she thought period cramps. Pain improved overnight and then became intense again yesterday associated with nausea and subjective fevers. Other 12-point review of systems negative. PHYSICAL EXAMINATION:    GENERAL:  Alert and oriented to time, place, and person, moderate distress.   VITAL SIGNS:  Temperature 99.4, pulse 106, respiratory rate 16, blood pressure 122/79, pulse ox 97% on room air. HEENT:  Atraumatic. Oropharynx clear. Moist mucous membranes. Ears, nose normal.  Eyes:  Anicteric sclerae. NECK:  Supple. No lymphadenopathy. Trachea midline. Full range of motion. LUNGS:  Clear to auscultation bilaterally. Normal respiratory effort. HEART:  Regular rate and rhythm. S1 and S2 auscultated. No murmur. ABDOMEN:  Soft, nondistended. Tenderness noted right lower quadrant with mild rebound tenderness. No guarding. Hypoactive bowel sounds. EXTREMITIES:  No peripheral edema, clubbing, or cyanosis. NEUROLOGIC:  Motor and sensory intact. ASSESSMENT AND PLAN:  Acute perforated appendicitis. The patient will be admitted to general medical floor. Interventional Radiology to place drain catheter, and General Surgery consult was notified. IV antibiotics. Pain and nausea control. Further recommendations to follow.     Dictated By Eunice Patrick MD  d: 08/07/2023 12:20:39  t: 08/07/2023 12:31:09  Job [id number]  /

## 2023-08-07 NOTE — PROGRESS NOTES
CT guided percutaneous drainage of periappendiceal abscess today  Discussed procedure, risks, benefits with patient who agrees to proceed

## 2023-08-07 NOTE — ED QUICK NOTES
Pt ambulatory to ED tx room 33 from triage. Pt A&O x 4, answering all ?s appropriately. Pt connected to cont ECG, pulse ox & BP. Pt here w/ c/o: N/V & abdominal pain. Pt reporting symptoms began approx 3 days ago, has had difficulty tolerating PO. Pain is localized to RLQ, pt still has appendix. Pt also reporting constipation, last BM Thursday 08/03. Pt denies diarrhea, dysuria, cp, sob, fevers/chills. 20g L AC PIV inserted, flushes easily w/ 10cc NS, no s/s infiltration, secured w/ Tegaderm & tape. Labs collected, labeled & bedside, verified using 2 pt identifiers, & sent to lab per standing orders. Cart in low/locked position, side rails up x 2, call light w/ in reach.

## 2023-08-07 NOTE — IMAGING NOTE
Patient returned to CT following oral contrast in anticipation of percutaneous drainage. The periappendiceal fluid collection and inflammatory changes are not organized into an abscess, not conducive to percutaneous drainage at this time. Recommend follow-up CT in 3-4 days.

## 2023-08-07 NOTE — PLAN OF CARE
Patient is resting in bed w/ call light within reach. Pain is being controlled w/ morphine. Is currently strict NPO. Has NS @ 100. Is voiding independently. States she has not had a bowel movement since Thursday due to not eating. Currently no nausea or vomiting. Plan pending surgery consult. Problem: Patient Centered Care  Goal: Patient preferences are identified and integrated in the patient's plan of care  Description: Interventions:  - What would you like us to know as we care for you?  My mom is at the bedside   - Provide timely, complete, and accurate information to patient/family  - Incorporate patient and family knowledge, values, beliefs, and cultural backgrounds into the planning and delivery of care  - Encourage patient/family to participate in care and decision-making at the level they choose  - Honor patient and family perspectives and choices  Outcome: Progressing     Problem: Patient/Family Goals  Goal: Patient/Family Long Term Goal  Description: Patient's Long Term Goal: To no longer have abdominal pain    Interventions:  - IB abx  - Pain medications  - See additional Care Plan goals for specific interventions  Outcome: Progressing  Goal: Patient/Family Short Term Goal  Description: Patient's Short Term Goal: To go home    Interventions:   - Follow plan of care  - See additional Care Plan goals for specific interventions  Outcome: Progressing     Problem: PAIN - ADULT  Goal: Verbalizes/displays adequate comfort level or patient's stated pain goal  Description: INTERVENTIONS:  - Encourage pt to monitor pain and request assistance  - Assess pain using appropriate pain scale  - Administer analgesics based on type and severity of pain and evaluate response  - Implement non-pharmacological measures as appropriate and evaluate response  - Consider cultural and social influences on pain and pain management  - Manage/alleviate anxiety  - Utilize distraction and/or relaxation techniques  - Monitor for opioid side effects  - Notify MD/LIP if interventions unsuccessful or patient reports new pain  - Anticipate increased pain with activity and pre-medicate as appropriate  Outcome: Progressing     Problem: RISK FOR INFECTION - ADULT  Goal: Absence of fever/infection during anticipated neutropenic period  Description: INTERVENTIONS  - Monitor WBC  - Administer growth factors as ordered  - Implement neutropenic guidelines  Outcome: Progressing     Problem: SAFETY ADULT - FALL  Goal: Free from fall injury  Description: INTERVENTIONS:  - Assess pt frequently for physical needs  - Identify cognitive and physical deficits and behaviors that affect risk of falls.   - [place] fall precautions as indicated by assessment.  - Educate pt/family on patient safety including physical limitations  - Instruct pt to call for assistance with activity based on assessment  - Modify environment to reduce risk of injury  - Provide assistive devices as appropriate  - Consider OT/PT consult to assist with strengthening/mobility  - Encourage toileting schedule  Outcome: Progressing     Problem: DISCHARGE PLANNING  Goal: Discharge to home or other facility with appropriate resources  Description: INTERVENTIONS:  - Identify barriers to discharge w/pt and caregiver  - Include patient/family/discharge partner in discharge planning  - Arrange for needed discharge resources and transportation as appropriate  - Identify discharge learning needs (meds, wound care, etc)  - Arrange for interpreters to assist at discharge as needed  - Consider post-discharge preferences of patient/family/discharge partner  - Complete POLST form as appropriate  - Assess patient's ability to be responsible for managing their own health  - Refer to Case Management Department for coordinating discharge planning if the patient needs post-hospital services based on physician/LIP order or complex needs related to functional status, cognitive ability or social support system  Outcome: Progressing

## 2023-08-08 LAB
ANION GAP SERPL CALC-SCNC: 6 MMOL/L (ref 0–18)
BASOPHILS # BLD AUTO: 0.05 X10(3) UL (ref 0–0.2)
BASOPHILS NFR BLD AUTO: 0.3 %
BUN BLD-MCNC: 15 MG/DL (ref 7–18)
BUN/CREAT SERPL: 15.6 (ref 10–20)
CALCIUM BLD-MCNC: 8 MG/DL (ref 8.5–10.1)
CHLORIDE SERPL-SCNC: 106 MMOL/L (ref 98–112)
CO2 SERPL-SCNC: 26 MMOL/L (ref 21–32)
CREAT BLD-MCNC: 0.96 MG/DL
DEPRECATED RDW RBC AUTO: 46.5 FL (ref 35.1–46.3)
EGFRCR SERPLBLD CKD-EPI 2021: 84 ML/MIN/1.73M2 (ref 60–?)
EOSINOPHIL # BLD AUTO: 0.04 X10(3) UL (ref 0–0.7)
EOSINOPHIL NFR BLD AUTO: 0.3 %
ERYTHROCYTE [DISTWIDTH] IN BLOOD BY AUTOMATED COUNT: 13.7 % (ref 11–15)
GLUCOSE BLD-MCNC: 81 MG/DL (ref 70–99)
HCT VFR BLD AUTO: 32.4 %
HGB BLD-MCNC: 10.4 G/DL
IMM GRANULOCYTES # BLD AUTO: 0.08 X10(3) UL (ref 0–1)
IMM GRANULOCYTES NFR BLD: 0.6 %
LYMPHOCYTES # BLD AUTO: 2.51 X10(3) UL (ref 1–4)
LYMPHOCYTES NFR BLD AUTO: 17.3 %
MCH RBC QN AUTO: 29.3 PG (ref 26–34)
MCHC RBC AUTO-ENTMCNC: 32.1 G/DL (ref 31–37)
MCV RBC AUTO: 91.3 FL
MONOCYTES # BLD AUTO: 1.1 X10(3) UL (ref 0.1–1)
MONOCYTES NFR BLD AUTO: 7.6 %
NEUTROPHILS # BLD AUTO: 10.71 X10 (3) UL (ref 1.5–7.7)
NEUTROPHILS # BLD AUTO: 10.71 X10(3) UL (ref 1.5–7.7)
NEUTROPHILS NFR BLD AUTO: 73.9 %
OSMOLALITY SERPL CALC.SUM OF ELEC: 286 MOSM/KG (ref 275–295)
PLATELET # BLD AUTO: 199 10(3)UL (ref 150–450)
POTASSIUM SERPL-SCNC: 3.6 MMOL/L (ref 3.5–5.1)
POTASSIUM SERPL-SCNC: 3.6 MMOL/L (ref 3.5–5.1)
RBC # BLD AUTO: 3.55 X10(6)UL
SODIUM SERPL-SCNC: 138 MMOL/L (ref 136–145)
WBC # BLD AUTO: 14.5 X10(3) UL (ref 4–11)

## 2023-08-08 PROCEDURE — 99233 SBSQ HOSP IP/OBS HIGH 50: CPT | Performed by: HOSPITALIST

## 2023-08-08 RX ORDER — IBUPROFEN 600 MG/1
600 TABLET ORAL EVERY 6 HOURS PRN
Status: DISCONTINUED | OUTPATIENT
Start: 2023-08-08 | End: 2023-08-11

## 2023-08-08 NOTE — PLAN OF CARE
Patient resting comfortably in bed, pain controlled with PCA. Seen by general surgery and placed on clear liquids, no current plan for surgery but repeat CT to be done on Thursday. Tolerating clears, denies nausea. Ambulating independently and voiding freely. Became slightly febrile this afternoon, MD aware and tylenol administered, fever resolved. Call light within reach and family at bedside. Frequent rounds in place.      Problem: Patient Centered Care  Goal: Patient preferences are identified and integrated in the patient's plan of care  Description: Interventions:  - What would you like us to know as we care for you?  - Provide timely, complete, and accurate information to patient/family  - Incorporate patient and family knowledge, values, beliefs, and cultural backgrounds into the planning and delivery of care  - Encourage patient/family to participate in care and decision-making at the level they choose  - Honor patient and family perspectives and choices  Outcome: Progressing       Problem: PAIN - ADULT  Goal: Verbalizes/displays adequate comfort level or patient's stated pain goal  Description: INTERVENTIONS:  - Encourage pt to monitor pain and request assistance  - Assess pain using appropriate pain scale  - Administer analgesics based on type and severity of pain and evaluate response  - Implement non-pharmacological measures as appropriate and evaluate response  - Consider cultural and social influences on pain and pain management  - Manage/alleviate anxiety  - Utilize distraction and/or relaxation techniques  - Monitor for opioid side effects  - Notify MD/LIP if interventions unsuccessful or patient reports new pain  - Anticipate increased pain with activity and pre-medicate as appropriate  Outcome: Progressing     Problem: RISK FOR INFECTION - ADULT  Goal: Absence of fever/infection during anticipated neutropenic period  Description: INTERVENTIONS  - Monitor WBC  - Administer growth factors as ordered  - Implement neutropenic guidelines  Outcome: Progressing     Problem: SAFETY ADULT - FALL  Goal: Free from fall injury  Description: INTERVENTIONS:  - Assess pt frequently for physical needs  - Identify cognitive and physical deficits and behaviors that affect risk of falls.   - [place] fall precautions as indicated by assessment.  - Educate pt/family on patient safety including physical limitations  - Instruct pt to call for assistance with activity based on assessment  - Modify environment to reduce risk of injury  - Provide assistive devices as appropriate  - Consider OT/PT consult to assist with strengthening/mobility  - Encourage toileting schedule  Outcome: Progressing     Problem: DISCHARGE PLANNING  Goal: Discharge to home or other facility with appropriate resources  Description: INTERVENTIONS:  - Identify barriers to discharge w/pt and caregiver  - Include patient/family/discharge partner in discharge planning  - Arrange for needed discharge resources and transportation as appropriate  - Identify discharge learning needs (meds, wound care, etc)  - Arrange for interpreters to assist at discharge as needed  - Consider post-discharge preferences of patient/family/discharge partner  - Complete POLST form as appropriate  - Assess patient's ability to be responsible for managing their own health  - Refer to Case Management Department for coordinating discharge planning if the patient needs post-hospital services based on physician/LIP order or complex needs related to functional status, cognitive ability or social support system  Outcome: Progressing

## 2023-08-09 LAB
ANION GAP SERPL CALC-SCNC: 8 MMOL/L (ref 0–18)
BASOPHILS # BLD AUTO: 0.03 X10(3) UL (ref 0–0.2)
BASOPHILS NFR BLD AUTO: 0.3 %
BUN BLD-MCNC: 12 MG/DL (ref 7–18)
BUN/CREAT SERPL: 16.2 (ref 10–20)
CALCIUM BLD-MCNC: 8.1 MG/DL (ref 8.5–10.1)
CHLORIDE SERPL-SCNC: 105 MMOL/L (ref 98–112)
CO2 SERPL-SCNC: 24 MMOL/L (ref 21–32)
CREAT BLD-MCNC: 0.74 MG/DL
DEPRECATED RDW RBC AUTO: 44.8 FL (ref 35.1–46.3)
EGFRCR SERPLBLD CKD-EPI 2021: 114 ML/MIN/1.73M2 (ref 60–?)
EOSINOPHIL # BLD AUTO: 0.13 X10(3) UL (ref 0–0.7)
EOSINOPHIL NFR BLD AUTO: 1.2 %
ERYTHROCYTE [DISTWIDTH] IN BLOOD BY AUTOMATED COUNT: 13.7 % (ref 11–15)
GLUCOSE BLD-MCNC: 89 MG/DL (ref 70–99)
HCT VFR BLD AUTO: 34.4 %
HGB BLD-MCNC: 11.6 G/DL
IMM GRANULOCYTES # BLD AUTO: 0.07 X10(3) UL (ref 0–1)
IMM GRANULOCYTES NFR BLD: 0.6 %
LYMPHOCYTES # BLD AUTO: 0.91 X10(3) UL (ref 1–4)
LYMPHOCYTES NFR BLD AUTO: 8.4 %
MAGNESIUM SERPL-MCNC: 2.1 MG/DL (ref 1.6–2.6)
MCH RBC QN AUTO: 29.9 PG (ref 26–34)
MCHC RBC AUTO-ENTMCNC: 33.7 G/DL (ref 31–37)
MCV RBC AUTO: 88.7 FL
MONOCYTES # BLD AUTO: 0.68 X10(3) UL (ref 0.1–1)
MONOCYTES NFR BLD AUTO: 6.3 %
NEUTROPHILS # BLD AUTO: 9.01 X10 (3) UL (ref 1.5–7.7)
NEUTROPHILS # BLD AUTO: 9.01 X10(3) UL (ref 1.5–7.7)
NEUTROPHILS NFR BLD AUTO: 83.2 %
OSMOLALITY SERPL CALC.SUM OF ELEC: 283 MOSM/KG (ref 275–295)
PHOSPHATE SERPL-MCNC: 1.8 MG/DL (ref 2.5–4.9)
PLATELET # BLD AUTO: 238 10(3)UL (ref 150–450)
POTASSIUM SERPL-SCNC: 3.6 MMOL/L (ref 3.5–5.1)
RBC # BLD AUTO: 3.88 X10(6)UL
SODIUM SERPL-SCNC: 137 MMOL/L (ref 136–145)
WBC # BLD AUTO: 10.8 X10(3) UL (ref 4–11)

## 2023-08-09 PROCEDURE — 99233 SBSQ HOSP IP/OBS HIGH 50: CPT | Performed by: HOSPITALIST

## 2023-08-09 NOTE — PLAN OF CARE
GEORGES IS ALERT AND ORIENTED. SHE IS VOIDING FREELY. AMBULATING BY SELF. PAIN MANAGED WITH PCA. RECEIVING ZOSYN Q8H. MILD TEMPERATURE, CAN HAVE MOTRIN AND/OR NORCO. ALSO USING I.S/ACAPPELA. PLAN IS TO CONTINUE ABX AND DO A FOLLOW UP IMAGING TO DECIDE FURTHER TREATMENT PLAN NEEDED. DISCHARGE WILL BE HOME WHEN MEDICALLY CLEARED.

## 2023-08-09 NOTE — CM/SW NOTE
08/09/23 1000   CM/SW Referral Data   Referral Source    Reason for Referral Discharge planning   Informant Patient; Mother   Medical Hx   Does patient have an established PCP? Yes  Caramarlen Hennessy)   Patient Info   Patient's Current Mental Status at Time of Assessment Alert;Oriented   Patient's [address]   Patient lives with Alone   Patient Status Prior to Admission   Independent with ADLs and Mobility Yes   Discharge Needs   Anticipated D/C needs To be determined     Pt discussed during nursing rounds. Dx ruptured appendix, on IV abx. From home alone, independent and working prior to dx. Pt's mother will stay with patient at dc. Possible sx vs drain placement per surgery. Possible LT IV abx at dc, pt requesting HH w/home infusion services at [place] if IV abx are required. HH and infusion referrals sent in [facility]. [place] [address] entered. Final ID recommendation/script will be needed for cost and provider choice. Final list of [facility] agencies will be needed for choice. Plan: TBD    / to remain available for support and/or discharge planning.      KADE George    [phone]

## 2023-08-10 ENCOUNTER — APPOINTMENT (OUTPATIENT)
Dept: CT IMAGING | Facility: HOSPITAL | Age: 28
End: 2023-08-10
Attending: PHYSICIAN ASSISTANT
Payer: COMMERCIAL

## 2023-08-10 LAB
BASOPHILS # BLD AUTO: 0.04 X10(3) UL (ref 0–0.2)
BASOPHILS NFR BLD AUTO: 0.4 %
DEPRECATED RDW RBC AUTO: 42.6 FL (ref 35.1–46.3)
EOSINOPHIL # BLD AUTO: 0.22 X10(3) UL (ref 0–0.7)
EOSINOPHIL NFR BLD AUTO: 2.2 %
ERYTHROCYTE [DISTWIDTH] IN BLOOD BY AUTOMATED COUNT: 13.3 % (ref 11–15)
HCT VFR BLD AUTO: 30.7 %
HGB BLD-MCNC: 10.1 G/DL
IMM GRANULOCYTES # BLD AUTO: 0.08 X10(3) UL (ref 0–1)
IMM GRANULOCYTES NFR BLD: 0.8 %
LYMPHOCYTES # BLD AUTO: 1.91 X10(3) UL (ref 1–4)
LYMPHOCYTES NFR BLD AUTO: 18.7 %
MCH RBC QN AUTO: 28.7 PG (ref 26–34)
MCHC RBC AUTO-ENTMCNC: 32.9 G/DL (ref 31–37)
MCV RBC AUTO: 87.2 FL
MONOCYTES # BLD AUTO: 0.72 X10(3) UL (ref 0.1–1)
MONOCYTES NFR BLD AUTO: 7.1 %
NEUTROPHILS # BLD AUTO: 7.24 X10 (3) UL (ref 1.5–7.7)
NEUTROPHILS # BLD AUTO: 7.24 X10(3) UL (ref 1.5–7.7)
NEUTROPHILS NFR BLD AUTO: 70.8 %
PHOSPHATE SERPL-MCNC: 2.8 MG/DL (ref 2.5–4.9)
PLATELET # BLD AUTO: 229 10(3)UL (ref 150–450)
RBC # BLD AUTO: 3.52 X10(6)UL
WBC # BLD AUTO: 10.2 X10(3) UL (ref 4–11)

## 2023-08-10 PROCEDURE — 99233 SBSQ HOSP IP/OBS HIGH 50: CPT | Performed by: SURGERY

## 2023-08-10 PROCEDURE — 99233 SBSQ HOSP IP/OBS HIGH 50: CPT | Performed by: HOSPITALIST

## 2023-08-10 PROCEDURE — 0W9G30Z DRAINAGE OF PERITONEAL CAVITY WITH DRAINAGE DEVICE, PERCUTANEOUS APPROACH: ICD-10-PCS | Performed by: RADIOLOGY

## 2023-08-10 PROCEDURE — 49406 IMAGE CATH FLUID PERI/RETRO: CPT | Performed by: PHYSICIAN ASSISTANT

## 2023-08-10 PROCEDURE — 99152 MOD SED SAME PHYS/QHP 5/>YRS: CPT | Performed by: PHYSICIAN ASSISTANT

## 2023-08-10 PROCEDURE — 74177 CT ABD & PELVIS W/CONTRAST: CPT | Performed by: PHYSICIAN ASSISTANT

## 2023-08-10 RX ORDER — HYDROMORPHONE HYDROCHLORIDE 1 MG/ML
0.8 INJECTION, SOLUTION INTRAMUSCULAR; INTRAVENOUS; SUBCUTANEOUS EVERY 4 HOURS PRN
Status: DISCONTINUED | OUTPATIENT
Start: 2023-08-10 | End: 2023-08-11

## 2023-08-10 RX ORDER — FLUMAZENIL 0.1 MG/ML
0.2 INJECTION INTRAVENOUS AS NEEDED
Status: DISCONTINUED | OUTPATIENT
Start: 2023-08-10 | End: 2023-08-10

## 2023-08-10 RX ORDER — NALOXONE HYDROCHLORIDE 0.4 MG/ML
80 INJECTION, SOLUTION INTRAMUSCULAR; INTRAVENOUS; SUBCUTANEOUS AS NEEDED
Status: DISCONTINUED | OUTPATIENT
Start: 2023-08-10 | End: 2023-08-10

## 2023-08-10 RX ORDER — HYDROMORPHONE HYDROCHLORIDE 1 MG/ML
0.5 INJECTION, SOLUTION INTRAMUSCULAR; INTRAVENOUS; SUBCUTANEOUS EVERY 4 HOURS PRN
Status: DISCONTINUED | OUTPATIENT
Start: 2023-08-10 | End: 2023-08-11

## 2023-08-10 RX ORDER — SODIUM CHLORIDE 9 MG/ML
INJECTION, SOLUTION INTRAVENOUS CONTINUOUS
Status: DISCONTINUED | OUTPATIENT
Start: 2023-08-10 | End: 2023-08-10

## 2023-08-10 RX ORDER — MIDAZOLAM HYDROCHLORIDE 1 MG/ML
INJECTION INTRAMUSCULAR; INTRAVENOUS
Status: COMPLETED
Start: 2023-08-10 | End: 2023-08-10

## 2023-08-10 RX ORDER — MIDAZOLAM HYDROCHLORIDE 1 MG/ML
1 INJECTION INTRAMUSCULAR; INTRAVENOUS EVERY 5 MIN PRN
Status: DISCONTINUED | OUTPATIENT
Start: 2023-08-10 | End: 2023-08-10

## 2023-08-10 NOTE — PLAN OF CARE
Patient is alert and oriented x4. On room air. Vital signs stable. Voiding freely. Ambulating independently, calls for assistance unplugging pumps. Pain is managed with Norco prn, pt was able to find relief without using the PCA pump. Given IV antibiotic coverage per order. Maintain NPO since midnight, okay sips with meds. D.C. Plan TBD pending CT results today. Call light within reach. Safety precaution in place.      Problem: Patient Centered Care  Goal: Patient preferences are identified and integrated in the patient's plan of care  Description: Interventions:  - What would you like us to know as we care for you?   - Provide timely, complete, and accurate information to patient/family  - Incorporate patient and family knowledge, values, beliefs, and cultural backgrounds into the planning and delivery of care  - Encourage patient/family to participate in care and decision-making at the level they choose  - Honor patient and family perspectives and choices  Outcome: Progressing     Problem: Patient/Family Goals  Goal: Patient/Family Long Term Goal  Description: Patient's Long Term Goal: Return home    Interventions:  - Follows plan of care  - Monitor labs  - CT  - See additional Care Plan goals for specific interventions  Outcome: Progressing  Goal: Patient/Family Short Term Goal  Description: Patient's Short Term Goal: No pain    Interventions:   - Pain management  - See additional Care Plan goals for specific interventions  Outcome: Progressing     Problem: PAIN - ADULT  Goal: Verbalizes/displays adequate comfort level or patient's stated pain goal  Description: INTERVENTIONS:  - Encourage pt to monitor pain and request assistance  - Assess pain using appropriate pain scale  - Administer analgesics based on type and severity of pain and evaluate response  - Implement non-pharmacological measures as appropriate and evaluate response  - Consider cultural and social influences on pain and pain management  - Manage/alleviate anxiety  - Utilize distraction and/or relaxation techniques  - Monitor for opioid side effects  - Notify MD/LIP if interventions unsuccessful or patient reports new pain  - Anticipate increased pain with activity and pre-medicate as appropriate  Outcome: Progressing     Problem: RISK FOR INFECTION - ADULT  Goal: Absence of fever/infection during anticipated neutropenic period  Description: INTERVENTIONS  - Monitor WBC  - Administer growth factors as ordered  - Implement neutropenic guidelines  Outcome: Progressing     Problem: SAFETY ADULT - FALL  Goal: Free from fall injury  Description: INTERVENTIONS:  - Assess pt frequently for physical needs  - Identify cognitive and physical deficits and behaviors that affect risk of falls.   - [place] fall precautions as indicated by assessment.  - Educate pt/family on patient safety including physical limitations  - Instruct pt to call for assistance with activity based on assessment  - Modify environment to reduce risk of injury  - Provide assistive devices as appropriate  - Consider OT/PT consult to assist with strengthening/mobility  - Encourage toileting schedule  Outcome: Progressing     Problem: DISCHARGE PLANNING  Goal: Discharge to home or other facility with appropriate resources  Description: INTERVENTIONS:  - Identify barriers to discharge w/pt and caregiver  - Include patient/family/discharge partner in discharge planning  - Arrange for needed discharge resources and transportation as appropriate  - Identify discharge learning needs (meds, wound care, etc)  - Arrange for interpreters to assist at discharge as needed  - Consider post-discharge preferences of patient/family/discharge partner  - Complete POLST form as appropriate  - Assess patient's ability to be responsible for managing their own health  - Refer to Case Management Department for coordinating discharge planning if the patient needs post-hospital services based on physician/LIP order or complex needs related to functional status, cognitive ability or social support system  Outcome: Progressing

## 2023-08-10 NOTE — PLAN OF CARE
Patient is alert and oriented x4. On room air. Vital signs stable. Encouraged Acapella at the bedside. Voiding freely. Ambulating independently, calls for assistance unplugging pumps. Received pt on PCA, Started pt with Norco 5mg and then increased her to the two 5mg tablets. Once increased to the two 5mg tablets patients stated she was able to find relief without using the PCA pump. Pt will be NPO at midnight, okay sips with meds. CT scheduled for tomorrow. Plan TBD pending CT results.      Problem: Patient Centered Care  Goal: Patient preferences are identified and integrated in the patient's plan of care  Description: Interventions:  - What would you like us to know as we care for you?  - Provide timely, complete, and accurate information to patient/family  - Incorporate patient and family knowledge, values, beliefs, and cultural backgrounds into the planning and delivery of care  - Encourage patient/family to participate in care and decision-making at the level they choose  - Honor patient and family perspectives and choices  Outcome: Progressing     Problem: PAIN - ADULT  Goal: Verbalizes/displays adequate comfort level or patient's stated pain goal  Description: INTERVENTIONS:  - Encourage pt to monitor pain and request assistance  - Assess pain using appropriate pain scale  - Administer analgesics based on type and severity of pain and evaluate response  - Implement non-pharmacological measures as appropriate and evaluate response  - Consider cultural and social influences on pain and pain management  - Manage/alleviate anxiety  - Utilize distraction and/or relaxation techniques  - Monitor for opioid side effects  - Notify MD/LIP if interventions unsuccessful or patient reports new pain  - Anticipate increased pain with activity and pre-medicate as appropriate  Outcome: Progressing     Problem: RISK FOR INFECTION - ADULT  Goal: Absence of fever/infection during anticipated neutropenic period  Description: INTERVENTIONS  - Monitor WBC  - Administer growth factors as ordered  - Implement neutropenic guidelines  Outcome: Progressing     Problem: SAFETY ADULT - FALL  Goal: Free from fall injury  Description: INTERVENTIONS:  - Assess pt frequently for physical needs  - Identify cognitive and physical deficits and behaviors that affect risk of falls.   - [place] fall precautions as indicated by assessment.  - Educate pt/family on patient safety including physical limitations  - Instruct pt to call for assistance with activity based on assessment  - Modify environment to reduce risk of injury  - Provide assistive devices as appropriate  - Consider OT/PT consult to assist with strengthening/mobility  - Encourage toileting schedule  Outcome: Progressing     Problem: DISCHARGE PLANNING  Goal: Discharge to home or other facility with appropriate resources  Description: INTERVENTIONS:  - Identify barriers to discharge w/pt and caregiver  - Include patient/family/discharge partner in discharge planning  - Arrange for needed discharge resources and transportation as appropriate  - Identify discharge learning needs (meds, wound care, etc)  - Arrange for interpreters to assist at discharge as needed  - Consider post-discharge preferences of patient/family/discharge partner  - Complete POLST form as appropriate  - Assess patient's ability to be responsible for managing their own health  - Refer to Case Management Department for coordinating discharge planning if the patient needs post-hospital services based on physician/LIP order or complex needs related to functional status, cognitive ability or social support system  Outcome: Progressing

## 2023-08-10 NOTE — BRIEF PROCEDURE NOTE
[facility]  Procedure Note    [facility] Patient Status:  Inpatient    3/28/1995 MRN [id number]   Location [facility] 4W/SW/SE Attending Marqius Varela MD   Hosp Day # 3 PCP Lissa Nelson MD     Procedure: CT-guided percutaneous drainage of right lower quadrant abscess    Pre-Procedure Diagnosis: Right lower quadrant periappendiceal abscess    Post-Procedure Diagnosis: Same    Anesthesia:  Sedation    Findings: Percutaneous drainage of right lower quadrant periappendiceal abscess, with placement of 10 Austrian drain, aspiration of 20 cc purulent fluid    Specimens: Fluid sample for C&S    Blood Loss: 0      Complications:  None    Drains: 10 Austrian APD      Lyn Cee MD  8/10/2023

## 2023-08-10 NOTE — IMAGING NOTE
Pt in CT Holding for recovery, s/p RLQ abdm 10 FR bulb suction drain placement under CT guidance by Dr Dorothy Gramajo. See Sedation Note. See VS Flowsheet. Pt sleepy, easily rouses but quickly back to sleep, said she feels groggy, encouraged to sleep. O2 replaced for O2 sat 91%, able to wean it back off & pt tolerating RA. Pt more awake by 1510, felt ready to go back to her room. Transport requested, report called to ARACELIS Hartman, at [phone], transporter arrived at [phone] to return pt to her room, pt awake, stable, drain intact.

## 2023-08-11 ENCOUNTER — APPOINTMENT (OUTPATIENT)
Dept: PICC SERVICES | Facility: HOSPITAL | Age: 28
End: 2023-08-11
Attending: PHYSICIAN ASSISTANT
Payer: COMMERCIAL

## 2023-08-11 VITALS
SYSTOLIC BLOOD PRESSURE: 138 MMHG | HEIGHT: 69 IN | DIASTOLIC BLOOD PRESSURE: 86 MMHG | OXYGEN SATURATION: 91 % | WEIGHT: 227 LBS | HEART RATE: 63 BPM | RESPIRATION RATE: 16 BRPM | BODY MASS INDEX: 33.62 KG/M2 | TEMPERATURE: 98 F

## 2023-08-11 DIAGNOSIS — K35.32 PERFORATED APPENDICITIS: Primary | ICD-10-CM

## 2023-08-11 LAB
BASOPHILS # BLD AUTO: 0.06 X10(3) UL (ref 0–0.2)
BASOPHILS NFR BLD AUTO: 0.8 %
DEPRECATED RDW RBC AUTO: 43.3 FL (ref 35.1–46.3)
EOSINOPHIL # BLD AUTO: 0.25 X10(3) UL (ref 0–0.7)
EOSINOPHIL NFR BLD AUTO: 3.3 %
ERYTHROCYTE [DISTWIDTH] IN BLOOD BY AUTOMATED COUNT: 13.5 % (ref 11–15)
HCT VFR BLD AUTO: 32.6 %
HGB BLD-MCNC: 10.9 G/DL
IMM GRANULOCYTES # BLD AUTO: 0.15 X10(3) UL (ref 0–1)
IMM GRANULOCYTES NFR BLD: 2 %
LYMPHOCYTES # BLD AUTO: 2.44 X10(3) UL (ref 1–4)
LYMPHOCYTES NFR BLD AUTO: 32.4 %
MCH RBC QN AUTO: 29.5 PG (ref 26–34)
MCHC RBC AUTO-ENTMCNC: 33.4 G/DL (ref 31–37)
MCV RBC AUTO: 88.1 FL
MONOCYTES # BLD AUTO: 0.48 X10(3) UL (ref 0.1–1)
MONOCYTES NFR BLD AUTO: 6.4 %
NEUTROPHILS # BLD AUTO: 4.16 X10 (3) UL (ref 1.5–7.7)
NEUTROPHILS # BLD AUTO: 4.16 X10(3) UL (ref 1.5–7.7)
NEUTROPHILS NFR BLD AUTO: 55.1 %
PLATELET # BLD AUTO: 311 10(3)UL (ref 150–450)
RBC # BLD AUTO: 3.7 X10(6)UL
WBC # BLD AUTO: 7.5 X10(3) UL (ref 4–11)

## 2023-08-11 PROCEDURE — 99239 HOSP IP/OBS DSCHRG MGMT >30: CPT | Performed by: HOSPITALIST

## 2023-08-11 PROCEDURE — 05HD33Z INSERTION OF INFUSION DEVICE INTO RIGHT CEPHALIC VEIN, PERCUTANEOUS APPROACH: ICD-10-PCS | Performed by: INTERNAL MEDICINE

## 2023-08-11 RX ORDER — HYDROCODONE BITARTRATE AND ACETAMINOPHEN 5; 325 MG/1; MG/1
1 TABLET ORAL EVERY 4 HOURS PRN
Qty: 30 TABLET | Refills: 0 | Status: SHIPPED | OUTPATIENT
Start: 2023-08-11

## 2023-08-11 RX ORDER — HYDROCODONE BITARTRATE AND ACETAMINOPHEN 5; 325 MG/1; MG/1
1 TABLET ORAL EVERY 4 HOURS PRN
Qty: 30 TABLET | Refills: 0 | Status: SHIPPED | OUTPATIENT
Start: 2023-08-11 | End: 2023-08-11

## 2023-08-11 NOTE — PLAN OF CARE
Patient is alert and oriented x4. On room air. Vital signs stable. Encouraged Acapella at the bedside. Voiding freely. Ambulating independently, calls for assistance unplugging pumps. PCA discontinued. [place] norco 5mg tablets q4 for pain management. PCA Pump discontinued. On clear liquid diet. EARLINE drain placed today by IR. Plan for midline placement tomorrow and possibly home on antibiotics.            Problem: Patient Centered Care  Goal: Patient preferences are identified and integrated in the patient's plan of care  Description: Interventions:  - Provide timely, complete, and accurate information to patient/family  - Incorporate patient and family knowledge, values, beliefs, and cultural backgrounds into the planning and delivery of care  - Encourage patient/family to participate in care and decision-making at the level they choose  - Honor patient and family perspectives and choices  Outcome: Progressing     Problem: PAIN - ADULT  Goal: Verbalizes/displays adequate comfort level or patient's stated pain goal  Description: INTERVENTIONS:  - Encourage pt to monitor pain and request assistance  - Assess pain using appropriate pain scale  - Administer analgesics based on type and severity of pain and evaluate response  - Implement non-pharmacological measures as appropriate and evaluate response  - Consider cultural and social influences on pain and pain management  - Manage/alleviate anxiety  - Utilize distraction and/or relaxation techniques  - Monitor for opioid side effects  - Notify MD/LIP if interventions unsuccessful or patient reports new pain  - Anticipate increased pain with activity and pre-medicate as appropriate  Outcome: Progressing     Problem: RISK FOR INFECTION - ADULT  Goal: Absence of fever/infection during anticipated neutropenic period  Description: INTERVENTIONS  - Monitor WBC  - Administer growth factors as ordered  - Implement neutropenic guidelines  Outcome: Progressing     Problem: SAFETY ADULT - FALL  Goal: Free from fall injury  Description: INTERVENTIONS:  - Assess pt frequently for physical needs  - Identify cognitive and physical deficits and behaviors that affect risk of falls.   - [place] fall precautions as indicated by assessment.  - Educate pt/family on patient safety including physical limitations  - Instruct pt to call for assistance with activity based on assessment  - Modify environment to reduce risk of injury  - Provide assistive devices as appropriate  - Consider OT/PT consult to assist with strengthening/mobility  - Encourage toileting schedule  Outcome: Progressing     Problem: DISCHARGE PLANNING  Goal: Discharge to home or other facility with appropriate resources  Description: INTERVENTIONS:  - Identify barriers to discharge w/pt and caregiver  - Include patient/family/discharge partner in discharge planning  - Arrange for needed discharge resources and transportation as appropriate  - Identify discharge learning needs (meds, wound care, etc)  - Arrange for interpreters to assist at discharge as needed  - Consider post-discharge preferences of patient/family/discharge partner  - Complete POLST form as appropriate  - Assess patient's ability to be responsible for managing their own health  - Refer to Case Management Department for coordinating discharge planning if the patient needs post-hospital services based on physician/LIP order or complex needs related to functional status, cognitive ability or social support system  Outcome: Progressing

## 2023-08-11 NOTE — PLAN OF CARE
Problem: Patient Centered Care  Goal: Patient preferences are identified and integrated in the patient's plan of care  Description: Interventions:  - What would you like us to know as we care for you? 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 - Provide timely, complete, and accurate information to patient/family  - Incorporate patient and family knowledge, values, beliefs, and cultural backgrounds into the planning and delivery of care  - Encourage patient/family to participate in care and decision-making at the level they choose  - Honor patient and family perspectives and choices  Outcome: Progressing     Problem: Patient/Family Goals  Goal: Patient/Family Long Term Goal  Description: Patient's Long Term Goal: Manage health    Interventions:  - Midline insertion  - longterm abx  - Educate patient on EARLINE drain  - Follow up with PCP outpatient.    - See additional Care Plan goals for specific interventions  Outcome: Progressing  Goal: Patient/Family Short Term Goal  Description: Patient's Short Term Goal: No pain    Interventions:   - Surgical consult  - IV abx  - Pain meds prn  - EARLINE drain  - See additional Care Plan goals for specific interventions  Outcome: Progressing     Problem: PAIN - ADULT  Goal: Verbalizes/displays adequate comfort level or patient's stated pain goal  Description: INTERVENTIONS:  - Encourage pt to monitor pain and request assistance  - Assess pain using appropriate pain scale  - Administer analgesics based on type and severity of pain and evaluate response  - Implement non-pharmacological measures as appropriate and evaluate response  - Consider cultural and social influences on pain and pain management  - Manage/alleviate anxiety  - Utilize distraction and/or relaxation techniques  - Monitor for opioid side effects  - Notify MD/LIP if interventions unsuccessful or patient reports new pain  - Anticipate increased pain with activity and pre-medicate as appropriate  Outcome: Not Progressing     Problem: RISK FOR INFECTION - ADULT  Goal: Absence of fever/infection during anticipated neutropenic period  Description: INTERVENTIONS  - Monitor WBC  - Administer growth factors as ordered  - Implement neutropenic guidelines  Outcome: Progressing     Problem: SAFETY ADULT - FALL  Goal: Free from fall injury  Description: INTERVENTIONS:  - Assess pt frequently for physical needs  - Identify cognitive and physical deficits and behaviors that affect risk of falls. - [place] fall precautions as indicated by assessment.  - Educate pt/family on patient safety including physical limitations  - Instruct pt to call for assistance with activity based on assessment  - Modify environment to reduce risk of injury  - Provide assistive devices as appropriate  - Consider OT/PT consult to assist with strengthening/mobility  - Encourage toileting schedule  Outcome: Progressing     Problem: DISCHARGE PLANNING  Goal: Discharge to home or other facility with appropriate resources  Description: INTERVENTIONS:  - Identify barriers to discharge w/pt and caregiver  - Include patient/family/discharge partner in discharge planning  - Arrange for needed discharge resources and transportation as appropriate  - Identify discharge learning needs (meds, wound care, etc)  - Arrange for interpreters to assist at discharge as needed  - Consider post-discharge preferences of patient/family/discharge partner  - Complete POLST form as appropriate  - Assess patient's ability to be responsible for managing their own health  - Refer to Case Management Department for coordinating discharge planning if the patient needs post-hospital services based on physician/LIP order or complex needs related to functional status, cognitive ability or social support system  Outcome: Progressing   EARLINE drain in place - irrigated and drained per protocol. See flowsheets. Norco given regularly to manage her pain - see MAR. Call light within reach.  Plan for midline for longterm abx.

## 2023-08-11 NOTE — PLAN OF CARE
Midline placed. Infusions set up. Drain care taught with teach back. Norco sent for pain. Instructions given on follow up. All questions answered. Problem: Patient Centered Care  Goal: Patient preferences are identified and integrated in the patient's plan of care  Description: Interventions:  - What would you like us to know as we care for you? 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 - Provide timely, complete, and accurate information to patient/family  - Incorporate patient and family knowledge, values, beliefs, and cultural backgrounds into the planning and delivery of care  - Encourage patient/family to participate in care and decision-making at the level they choose  - Honor patient and family perspectives and choices  Outcome: Adequate for Discharge     Problem: Patient/Family Goals  Goal: Patient/Family Long Term Goal  Description: Patient's Long Term Goal: Manage health    Interventions:  - Midline insertion  - longterm abx  - Educate patient on EARLINE drain  - Follow up with PCP outpatient.    - See additional Care Plan goals for specific interventions  Outcome: Adequate for Discharge  Goal: Patient/Family Short Term Goal  Description: Patient's Short Term Goal: No pain    Interventions:   - Surgical consult  - IV abx  - Pain meds prn  - EARLINE drain  - See additional Care Plan goals for specific interventions  Outcome: Adequate for Discharge     Problem: PAIN - ADULT  Goal: Verbalizes/displays adequate comfort level or patient's stated pain goal  Description: INTERVENTIONS:  - Encourage pt to monitor pain and request assistance  - Assess pain using appropriate pain scale  - Administer analgesics based on type and severity of pain and evaluate response  - Implement non-pharmacological measures as appropriate and evaluate response  - Consider cultural and social influences on pain and pain management  - Manage/alleviate anxiety  - Utilize distraction and/or relaxation techniques  - Monitor for opioid side effects  - Notify MD/LIP if interventions unsuccessful or patient reports new pain  - Anticipate increased pain with activity and pre-medicate as appropriate  Outcome: Adequate for Discharge     Problem: RISK FOR INFECTION - ADULT  Goal: Absence of fever/infection during anticipated neutropenic period  Description: INTERVENTIONS  - Monitor WBC  - Administer growth factors as ordered  - Implement neutropenic guidelines  Outcome: Adequate for Discharge     Problem: SAFETY ADULT - FALL  Goal: Free from fall injury  Description: INTERVENTIONS:  - Assess pt frequently for physical needs  - Identify cognitive and physical deficits and behaviors that affect risk of falls.   - Humble fall precautions as indicated by assessment.  - Educate pt/family on patient safety including physical limitations  - Instruct pt to call for assistance with activity based on assessment  - Modify environment to reduce risk of injury  - Provide assistive devices as appropriate  - Consider OT/PT consult to assist with strengthening/mobility  - Encourage toileting schedule  Outcome: Adequate for Discharge     Problem: DISCHARGE PLANNING  Goal: Discharge to home or other facility with appropriate resources  Description: INTERVENTIONS:  - Identify barriers to discharge w/pt and caregiver  - Include patient/family/discharge partner in discharge planning  - Arrange for needed discharge resources and transportation as appropriate  - Identify discharge learning needs (meds, wound care, etc)  - Arrange for interpreters to assist at discharge as needed  - Consider post-discharge preferences of patient/family/discharge partner  - Complete POLST form as appropriate  - Assess patient's ability to be responsible for managing their own health  - Refer to Case Management Department for coordinating discharge planning if the patient needs post-hospital services based on physician/LIP order or complex needs related to functional status, cognitive ability or social support system  Outcome: Adequate for Discharge

## 2023-08-12 ENCOUNTER — NURSE ONLY (OUTPATIENT)
Dept: HEMATOLOGY/ONCOLOGY | Facility: HOSPITAL | Age: 28
End: 2023-08-12
Attending: INTERNAL MEDICINE
Payer: COMMERCIAL

## 2023-08-12 VITALS
RESPIRATION RATE: 16 BRPM | DIASTOLIC BLOOD PRESSURE: 82 MMHG | HEART RATE: 84 BPM | SYSTOLIC BLOOD PRESSURE: 118 MMHG | OXYGEN SATURATION: 100 % | TEMPERATURE: 98 F

## 2023-08-12 DIAGNOSIS — K35.32 PERFORATED APPENDICITIS: Primary | ICD-10-CM

## 2023-08-12 PROCEDURE — 96365 THER/PROPH/DIAG IV INF INIT: CPT

## 2023-08-12 RX ORDER — 0.9 % SODIUM CHLORIDE 0.9 %
INTRAVENOUS SOLUTION INTRAVENOUS
Status: DISCONTINUED
Start: 2023-08-12 | End: 2023-08-12

## 2023-08-12 RX ORDER — ERTAPENEM 1 G/1
INJECTION, POWDER, LYOPHILIZED, FOR SOLUTION INTRAMUSCULAR; INTRAVENOUS
Status: DISCONTINUED
Start: 2023-08-12 | End: 2023-08-12

## 2023-08-12 NOTE — PROGRESS NOTES
First dose of abx outpatient. Reviewed plan of care for, importance of scheduling follow up, potential side effects of and how to manage, picc line care, and schedule for. Has an abdominal drain-to call IR to schedule follow up    Invanz infused with no s/s of adverse reaction noted. PICC flushed, capped, and secured. Discharged stable.     EOT 8/25

## 2023-08-13 ENCOUNTER — NURSE ONLY (OUTPATIENT)
Dept: HEMATOLOGY/ONCOLOGY | Facility: HOSPITAL | Age: 28
End: 2023-08-13
Attending: INTERNAL MEDICINE
Payer: COMMERCIAL

## 2023-08-13 VITALS
TEMPERATURE: 98 F | DIASTOLIC BLOOD PRESSURE: 80 MMHG | HEART RATE: 93 BPM | RESPIRATION RATE: 16 BRPM | OXYGEN SATURATION: 100 % | SYSTOLIC BLOOD PRESSURE: 104 MMHG

## 2023-08-13 DIAGNOSIS — K35.32 PERFORATED APPENDICITIS: Primary | ICD-10-CM

## 2023-08-13 PROCEDURE — 96365 THER/PROPH/DIAG IV INF INIT: CPT

## 2023-08-13 RX ORDER — ERTAPENEM 1 G/1
INJECTION, POWDER, LYOPHILIZED, FOR SOLUTION INTRAMUSCULAR; INTRAVENOUS
Status: DISCONTINUED
Start: 2023-08-13 | End: 2023-08-13

## 2023-08-13 RX ORDER — 0.9 % SODIUM CHLORIDE 0.9 %
INTRAVENOUS SOLUTION INTRAVENOUS
Status: DISCONTINUED
Start: 2023-08-13 | End: 2023-08-13

## 2023-08-13 NOTE — PROGRESS NOTES
.abx    Invanz infused with no s/s of adverse reaction noted. PICC flushed, capped, and secured. Discharged stable.     EOT 8/25

## 2023-08-14 ENCOUNTER — TELEPHONE (OUTPATIENT)
Dept: INTERNAL MEDICINE CLINIC | Facility: CLINIC | Age: 28
End: 2023-08-14

## 2023-08-14 ENCOUNTER — NURSE ONLY (OUTPATIENT)
Dept: HEMATOLOGY/ONCOLOGY | Facility: HOSPITAL | Age: 28
End: 2023-08-14
Attending: INTERNAL MEDICINE
Payer: COMMERCIAL

## 2023-08-14 ENCOUNTER — PATIENT OUTREACH (OUTPATIENT)
Dept: CASE MANAGEMENT | Age: 28
End: 2023-08-14

## 2023-08-14 VITALS
DIASTOLIC BLOOD PRESSURE: 76 MMHG | RESPIRATION RATE: 16 BRPM | TEMPERATURE: 98 F | HEART RATE: 84 BPM | OXYGEN SATURATION: 99 % | SYSTOLIC BLOOD PRESSURE: 116 MMHG

## 2023-08-14 DIAGNOSIS — Z02.9 ENCOUNTERS FOR UNSPECIFIED ADMINISTRATIVE PURPOSE: ICD-10-CM

## 2023-08-14 DIAGNOSIS — K35.32 APPENDICITIS WITH PERFORATION: Primary | ICD-10-CM

## 2023-08-14 DIAGNOSIS — K35.32 PERFORATED APPENDICITIS: Primary | ICD-10-CM

## 2023-08-14 LAB
ALBUMIN SERPL-MCNC: 3.3 G/DL (ref 3.4–5)
ALBUMIN/GLOB SERPL: 0.7 {RATIO} (ref 1–2)
ALP LIVER SERPL-CCNC: 77 U/L
ALT SERPL-CCNC: 100 U/L
ANION GAP SERPL CALC-SCNC: 7 MMOL/L (ref 0–18)
AST SERPL-CCNC: 61 U/L (ref 15–37)
BASOPHILS # BLD AUTO: 0.06 X10(3) UL (ref 0–0.2)
BASOPHILS NFR BLD AUTO: 0.7 %
BILIRUB SERPL-MCNC: 0.3 MG/DL (ref 0.1–2)
BUN BLD-MCNC: 11 MG/DL (ref 7–18)
BUN/CREAT SERPL: 12.2 (ref 10–20)
CALCIUM BLD-MCNC: 9.2 MG/DL (ref 8.5–10.1)
CHLORIDE SERPL-SCNC: 106 MMOL/L (ref 98–112)
CO2 SERPL-SCNC: 27 MMOL/L (ref 21–32)
CREAT BLD-MCNC: 0.9 MG/DL
DEPRECATED RDW RBC AUTO: 43.3 FL (ref 35.1–46.3)
EGFRCR SERPLBLD CKD-EPI 2021: 90 ML/MIN/1.73M2 (ref 60–?)
EOSINOPHIL # BLD AUTO: 0.15 X10(3) UL (ref 0–0.7)
EOSINOPHIL NFR BLD AUTO: 1.6 %
ERYTHROCYTE [DISTWIDTH] IN BLOOD BY AUTOMATED COUNT: 13.9 % (ref 11–15)
FASTING STATUS PATIENT QL REPORTED: NO
GLOBULIN PLAS-MCNC: 4.7 G/DL (ref 2.8–4.4)
GLUCOSE BLD-MCNC: 90 MG/DL (ref 70–99)
HCT VFR BLD AUTO: 41.1 %
HGB BLD-MCNC: 13.8 G/DL
IMM GRANULOCYTES # BLD AUTO: 0.16 X10(3) UL (ref 0–1)
IMM GRANULOCYTES NFR BLD: 1.7 %
LYMPHOCYTES # BLD AUTO: 3.1 X10(3) UL (ref 1–4)
LYMPHOCYTES NFR BLD AUTO: 33.9 %
MCH RBC QN AUTO: 29 PG (ref 26–34)
MCHC RBC AUTO-ENTMCNC: 33.6 G/DL (ref 31–37)
MCV RBC AUTO: 86.3 FL
MONOCYTES # BLD AUTO: 0.67 X10(3) UL (ref 0.1–1)
MONOCYTES NFR BLD AUTO: 7.3 %
NEUTROPHILS # BLD AUTO: 5.01 X10 (3) UL (ref 1.5–7.7)
NEUTROPHILS # BLD AUTO: 5.01 X10(3) UL (ref 1.5–7.7)
NEUTROPHILS NFR BLD AUTO: 54.8 %
OSMOLALITY SERPL CALC.SUM OF ELEC: 289 MOSM/KG (ref 275–295)
PLATELET # BLD AUTO: 472 10(3)UL (ref 150–450)
POTASSIUM SERPL-SCNC: 3.4 MMOL/L (ref 3.5–5.1)
PROT SERPL-MCNC: 8 G/DL (ref 6.4–8.2)
RBC # BLD AUTO: 4.76 X10(6)UL
SODIUM SERPL-SCNC: 140 MMOL/L (ref 136–145)
WBC # BLD AUTO: 9.2 X10(3) UL (ref 4–11)

## 2023-08-14 PROCEDURE — 80053 COMPREHEN METABOLIC PANEL: CPT

## 2023-08-14 PROCEDURE — 1111F DSCHRG MED/CURRENT MED MERGE: CPT

## 2023-08-14 PROCEDURE — 85025 COMPLETE CBC W/AUTO DIFF WBC: CPT

## 2023-08-14 PROCEDURE — 96365 THER/PROPH/DIAG IV INF INIT: CPT

## 2023-08-14 RX ORDER — 0.9 % SODIUM CHLORIDE 0.9 %
INTRAVENOUS SOLUTION INTRAVENOUS
Status: DISCONTINUED
Start: 2023-08-14 | End: 2023-08-14

## 2023-08-14 RX ORDER — ERTAPENEM 1 G/1
INJECTION, POWDER, LYOPHILIZED, FOR SOLUTION INTRAMUSCULAR; INTRAVENOUS
Status: DISCONTINUED
Start: 2023-08-14 | End: 2023-08-14

## 2023-08-14 NOTE — TELEPHONE ENCOUNTER
Sent as FYI/TCM protocol:    Spoke with pt for TCM today. Offered pt sooner appt than 8/28/2023 non-TCM date--pt declines--she is aware it is outside recommended TCM appt timeframe. She will call today for specialist f/u appts, as advised--prefers to complete 2 weeks of daily IV abx at infusion center, prior to seeing PCP. Per guidelines patient should  be seen within seven days by 8/18/2023. Otherwise, last date for TCM: 8/25/2023        Future Appointments   Date Time Provider Keira Hudson   8/14/2023  4:30 PM EM [facility] INFRN 3 [facility] CHEMO EMO   8/15/2023  3:30 PM EM [facility] INFRN 5 [facility]MO EMO   8/16/2023  6:00 PM EM [facility] INFRN 1 [facility] EMO   8/17/2023  4:30 PM EM [facility] INFRN 7 [facility]MO EMO   8/18/2023  3:30 PM EM [facility] INFRN 6 [facility]MO EMO   8/19/2023  9:45 AM EM CC INFRN 1 [facility] CHEMO EMO   8/20/2023  8:45 AM EM CC INFRN 1 [facility]MO EMO   8/21/2023  4:30 PM EM CC INFRN 7 [facility] CHEMO EMO   8/22/2023  2:30 PM EM CC INFRN 3 [facility] CHEMO EMO   8/23/2023  5:00 PM EM CC INFRN 1 [facility] CHEMO EMO   8/24/2023  4:30 PM EM CC INFRN 7 [facility] CHE[facility] EMO   8/25/2023  3:30 PM EM CC INFRN 2 [facility] CHEMO EMO   8/28/2023  3:20 PM Laurie Mckeon MD [facility]     Follow up appointments:     Follow up With Specialties Details Why Contact Info   Laurie Mckeon MD Internal Medicine Follow up [address] [facility] Discharge Followup [address] [facility]  [phone]   Cris Campbell MD Surgical Oncology Follow up [address] [facility] Discharge Followup [address]   Nancy Jacobo MD INFECTIOUS DISEASES Follow up [address] [facility] Discharge Followup [address] [facility]   Nestor Elizabeth MD [facility] Schedule an appointment as soon as possible for a visit in 2 week(s) for drain check [address] [facility] [address] [phone]

## 2023-08-14 NOTE — PROGRESS NOTES
Gloria to infusion today for daily Invanz for perforated appendicitis. She arrives alert and independent, accompanied by a family member. She denies pain or other complaints. Midline flushes easily with NS and has positive blood return noted. Weekly labs drawn and sent as ordered- CBC and CMP. Midline flushed and Invanz given over 30 minutes. Patient tolerated well. Midline saline locked and alcohol cap placed. Enriqueta Bashir discharged in stable condition, aware of future appointments.     EOT: 8/25/23  States no MD appt's this week

## 2023-08-15 ENCOUNTER — NURSE ONLY (OUTPATIENT)
Dept: HEMATOLOGY/ONCOLOGY | Facility: HOSPITAL | Age: 28
End: 2023-08-15
Attending: INTERNAL MEDICINE
Payer: COMMERCIAL

## 2023-08-15 VITALS
TEMPERATURE: 98 F | OXYGEN SATURATION: 98 % | HEART RATE: 91 BPM | RESPIRATION RATE: 16 BRPM | SYSTOLIC BLOOD PRESSURE: 115 MMHG | DIASTOLIC BLOOD PRESSURE: 79 MMHG

## 2023-08-15 DIAGNOSIS — K35.32 PERFORATED APPENDICITIS: Primary | ICD-10-CM

## 2023-08-15 PROCEDURE — 96365 THER/PROPH/DIAG IV INF INIT: CPT

## 2023-08-15 NOTE — PROGRESS NOTES
Gloria to infusion today for daily Invanz for perforated appendicitis. She ambulatory and without complaints      Midline flushes easily with NS and has positive blood return noted. Midline flushed and Invanz given over 30 minutes. Patient tolerated well. Midline saline locked and alcohol cap placed. Archana Bermudez discharged in stable condition, aware of future appointments.     EOT: 8/25/23  States no MD appt's this week

## 2023-08-16 ENCOUNTER — NURSE ONLY (OUTPATIENT)
Dept: HEMATOLOGY/ONCOLOGY | Facility: HOSPITAL | Age: 28
End: 2023-08-16
Attending: INTERNAL MEDICINE
Payer: COMMERCIAL

## 2023-08-16 VITALS
RESPIRATION RATE: 16 BRPM | OXYGEN SATURATION: 97 % | TEMPERATURE: 98 F | HEART RATE: 93 BPM | SYSTOLIC BLOOD PRESSURE: 114 MMHG | DIASTOLIC BLOOD PRESSURE: 80 MMHG

## 2023-08-16 DIAGNOSIS — K35.32 PERFORATED APPENDICITIS: Primary | ICD-10-CM

## 2023-08-16 PROCEDURE — 96365 THER/PROPH/DIAG IV INF INIT: CPT

## 2023-08-16 RX ORDER — ERTAPENEM 1 G/1
INJECTION, POWDER, LYOPHILIZED, FOR SOLUTION INTRAMUSCULAR; INTRAVENOUS
Status: DISCONTINUED
Start: 2023-08-16 | End: 2023-08-16

## 2023-08-16 RX ORDER — 0.9 % SODIUM CHLORIDE 0.9 %
INTRAVENOUS SOLUTION INTRAVENOUS
Status: DISCONTINUED
Start: 2023-08-16 | End: 2023-08-16

## 2023-08-16 NOTE — PROGRESS NOTES
Pt here for [facility] for tx of perforated appendix Pt denies any issues or concerns. Ordering MD: henri  Order Exp: 8/25/23     Pt tolerated infusion without difficulty or complaint.        Education Record    Learner:  Patient    Disease / Diagnosis:    Barriers / Limitations:  None   Comments:    Method:  Discussion   Comments:    General Topics:  Plan of care reviewed   Comments:    Outcome:  Shows understanding   Comments:

## 2023-08-17 ENCOUNTER — NURSE ONLY (OUTPATIENT)
Dept: HEMATOLOGY/ONCOLOGY | Facility: HOSPITAL | Age: 28
End: 2023-08-17
Attending: INTERNAL MEDICINE
Payer: COMMERCIAL

## 2023-08-17 VITALS
HEART RATE: 91 BPM | DIASTOLIC BLOOD PRESSURE: 69 MMHG | RESPIRATION RATE: 16 BRPM | TEMPERATURE: 98 F | OXYGEN SATURATION: 100 % | SYSTOLIC BLOOD PRESSURE: 107 MMHG

## 2023-08-17 DIAGNOSIS — K35.32 PERFORATED APPENDICITIS: Primary | ICD-10-CM

## 2023-08-17 PROCEDURE — 96365 THER/PROPH/DIAG IV INF INIT: CPT

## 2023-08-17 RX ORDER — 0.9 % SODIUM CHLORIDE 0.9 %
INTRAVENOUS SOLUTION INTRAVENOUS
Status: DISCONTINUED
Start: 2023-08-17 | End: 2023-08-17

## 2023-08-17 RX ORDER — ERTAPENEM 1 G/1
INJECTION, POWDER, LYOPHILIZED, FOR SOLUTION INTRAMUSCULAR; INTRAVENOUS
Status: DISCONTINUED
Start: 2023-08-17 | End: 2023-08-17

## 2023-08-17 NOTE — PROGRESS NOTES
Pt here for [facility] for tx of perforated appendix Pt denies any issues or concerns. Ordering MD: henri  Order Exp: 8/25/23     Pt tolerated infusion without difficulty or complaint.        Education Record    Learner:  Patient    Disease / Diagnosis:    Barriers / Limitations:  None   Comments:    Method:  Discussion   Comments:    General Topics:  Plan of care reviewed   Comments:    Outcome:  Shows understanding   Comments:

## 2023-08-18 ENCOUNTER — NURSE ONLY (OUTPATIENT)
Dept: HEMATOLOGY/ONCOLOGY | Facility: HOSPITAL | Age: 28
End: 2023-08-18
Attending: INTERNAL MEDICINE
Payer: COMMERCIAL

## 2023-08-18 VITALS
OXYGEN SATURATION: 98 % | HEART RATE: 100 BPM | TEMPERATURE: 98 F | SYSTOLIC BLOOD PRESSURE: 105 MMHG | RESPIRATION RATE: 16 BRPM | DIASTOLIC BLOOD PRESSURE: 71 MMHG

## 2023-08-18 DIAGNOSIS — K35.32 PERFORATED APPENDICITIS: Primary | ICD-10-CM

## 2023-08-18 PROCEDURE — 96365 THER/PROPH/DIAG IV INF INIT: CPT

## 2023-08-18 RX ORDER — ERTAPENEM 1 G/1
INJECTION, POWDER, LYOPHILIZED, FOR SOLUTION INTRAMUSCULAR; INTRAVENOUS
Status: DISCONTINUED
Start: 2023-08-18 | End: 2023-08-18

## 2023-08-18 RX ORDER — 0.9 % SODIUM CHLORIDE 0.9 %
INTRAVENOUS SOLUTION INTRAVENOUS
Status: DISCONTINUED
Start: 2023-08-18 | End: 2023-08-18

## 2023-08-18 NOTE — PROGRESS NOTES
Patient arrives ambulating independently for daily antibiotics of INVANZ for perforated appendicitis. RIGHT MIDLINE in place, positive blood return. Dressing changed per protocol. Denies any complaints, tolerated infusion well, no s/s of adverse reaction.      EOT: 8/25

## 2023-08-19 ENCOUNTER — NURSE ONLY (OUTPATIENT)
Dept: HEMATOLOGY/ONCOLOGY | Facility: HOSPITAL | Age: 28
End: 2023-08-19
Attending: INTERNAL MEDICINE
Payer: COMMERCIAL

## 2023-08-19 VITALS
TEMPERATURE: 98 F | DIASTOLIC BLOOD PRESSURE: 77 MMHG | OXYGEN SATURATION: 98 % | HEART RATE: 87 BPM | SYSTOLIC BLOOD PRESSURE: 114 MMHG | RESPIRATION RATE: 16 BRPM

## 2023-08-19 DIAGNOSIS — K35.32 PERFORATED APPENDICITIS: Primary | ICD-10-CM

## 2023-08-19 PROCEDURE — 96365 THER/PROPH/DIAG IV INF INIT: CPT

## 2023-08-19 RX ORDER — ERTAPENEM 1 G/1
INJECTION, POWDER, LYOPHILIZED, FOR SOLUTION INTRAMUSCULAR; INTRAVENOUS
Status: DISPENSED
Start: 2023-08-19 | End: 2023-08-19

## 2023-08-19 RX ORDER — 0.9 % SODIUM CHLORIDE 0.9 %
INTRAVENOUS SOLUTION INTRAVENOUS
Status: DISPENSED
Start: 2023-08-19 | End: 2023-08-19

## 2023-08-19 NOTE — PROGRESS NOTES
Kayy Corona is here for daily Abelino Chris for a perforated Appendicitis. She offers no complaints. Gloria tolerated infusion well and discharged stable.     EOT-8/25

## 2023-08-20 ENCOUNTER — NURSE ONLY (OUTPATIENT)
Dept: HEMATOLOGY/ONCOLOGY | Facility: HOSPITAL | Age: 28
End: 2023-08-20
Attending: INTERNAL MEDICINE
Payer: COMMERCIAL

## 2023-08-20 VITALS
HEART RATE: 88 BPM | DIASTOLIC BLOOD PRESSURE: 77 MMHG | TEMPERATURE: 98 F | SYSTOLIC BLOOD PRESSURE: 117 MMHG | RESPIRATION RATE: 16 BRPM | OXYGEN SATURATION: 97 %

## 2023-08-20 DIAGNOSIS — K35.32 PERFORATED APPENDICITIS: Primary | ICD-10-CM

## 2023-08-20 PROCEDURE — 96365 THER/PROPH/DIAG IV INF INIT: CPT

## 2023-08-20 RX ORDER — 0.9 % SODIUM CHLORIDE 0.9 %
INTRAVENOUS SOLUTION INTRAVENOUS
Status: DISCONTINUED
Start: 2023-08-20 | End: 2023-08-20

## 2023-08-20 RX ORDER — ERTAPENEM 1 G/1
INJECTION, POWDER, LYOPHILIZED, FOR SOLUTION INTRAMUSCULAR; INTRAVENOUS
Status: DISCONTINUED
Start: 2023-08-20 | End: 2023-08-20

## 2023-08-20 NOTE — PROGRESS NOTES
Mayank Mir is here for daily Joe Brain for a perforated Appendicitis. She offers no complaints. Gloria tolerated infusion well and discharged stable.     EOT-8/25

## 2023-08-21 ENCOUNTER — NURSE ONLY (OUTPATIENT)
Dept: HEMATOLOGY/ONCOLOGY | Facility: HOSPITAL | Age: 28
End: 2023-08-21
Attending: INTERNAL MEDICINE
Payer: COMMERCIAL

## 2023-08-21 VITALS
SYSTOLIC BLOOD PRESSURE: 109 MMHG | HEART RATE: 111 BPM | DIASTOLIC BLOOD PRESSURE: 69 MMHG | OXYGEN SATURATION: 96 % | RESPIRATION RATE: 18 BRPM | TEMPERATURE: 98 F

## 2023-08-21 DIAGNOSIS — K35.32 PERFORATED APPENDICITIS: Primary | ICD-10-CM

## 2023-08-21 LAB
ALBUMIN SERPL-MCNC: 3.6 G/DL (ref 3.4–5)
ALBUMIN/GLOB SERPL: 0.8 {RATIO} (ref 1–2)
ALP LIVER SERPL-CCNC: 77 U/L
ALT SERPL-CCNC: 51 U/L
ANION GAP SERPL CALC-SCNC: 8 MMOL/L (ref 0–18)
AST SERPL-CCNC: 27 U/L (ref 15–37)
BASOPHILS # BLD AUTO: 0.06 X10(3) UL (ref 0–0.2)
BASOPHILS NFR BLD AUTO: 0.6 %
BILIRUB SERPL-MCNC: 0.3 MG/DL (ref 0.1–2)
BUN BLD-MCNC: 15 MG/DL (ref 7–18)
BUN/CREAT SERPL: 14.6 (ref 10–20)
CALCIUM BLD-MCNC: 9.4 MG/DL (ref 8.5–10.1)
CHLORIDE SERPL-SCNC: 104 MMOL/L (ref 98–112)
CO2 SERPL-SCNC: 27 MMOL/L (ref 21–32)
CREAT BLD-MCNC: 1.03 MG/DL
DEPRECATED RDW RBC AUTO: 43.2 FL (ref 35.1–46.3)
EGFRCR SERPLBLD CKD-EPI 2021: 77 ML/MIN/1.73M2 (ref 60–?)
EOSINOPHIL # BLD AUTO: 0.12 X10(3) UL (ref 0–0.7)
EOSINOPHIL NFR BLD AUTO: 1.2 %
ERYTHROCYTE [DISTWIDTH] IN BLOOD BY AUTOMATED COUNT: 13.6 % (ref 11–15)
FASTING STATUS PATIENT QL REPORTED: NO
GLOBULIN PLAS-MCNC: 4.3 G/DL (ref 2.8–4.4)
GLUCOSE BLD-MCNC: 99 MG/DL (ref 70–99)
HCT VFR BLD AUTO: 39.3 %
HGB BLD-MCNC: 13.1 G/DL
IMM GRANULOCYTES # BLD AUTO: 0.02 X10(3) UL (ref 0–1)
IMM GRANULOCYTES NFR BLD: 0.2 %
LYMPHOCYTES # BLD AUTO: 3.34 X10(3) UL (ref 1–4)
LYMPHOCYTES NFR BLD AUTO: 33.4 %
MCH RBC QN AUTO: 28.9 PG (ref 26–34)
MCHC RBC AUTO-ENTMCNC: 33.3 G/DL (ref 31–37)
MCV RBC AUTO: 86.8 FL
MONOCYTES # BLD AUTO: 0.53 X10(3) UL (ref 0.1–1)
MONOCYTES NFR BLD AUTO: 5.3 %
NEUTROPHILS # BLD AUTO: 5.94 X10 (3) UL (ref 1.5–7.7)
NEUTROPHILS # BLD AUTO: 5.94 X10(3) UL (ref 1.5–7.7)
NEUTROPHILS NFR BLD AUTO: 59.3 %
OSMOLALITY SERPL CALC.SUM OF ELEC: 289 MOSM/KG (ref 275–295)
PLATELET # BLD AUTO: 334 10(3)UL (ref 150–450)
POTASSIUM SERPL-SCNC: 3.7 MMOL/L (ref 3.5–5.1)
PROT SERPL-MCNC: 7.9 G/DL (ref 6.4–8.2)
RBC # BLD AUTO: 4.53 X10(6)UL
SODIUM SERPL-SCNC: 139 MMOL/L (ref 136–145)
WBC # BLD AUTO: 10 X10(3) UL (ref 4–11)

## 2023-08-21 PROCEDURE — 96365 THER/PROPH/DIAG IV INF INIT: CPT

## 2023-08-21 PROCEDURE — 80053 COMPREHEN METABOLIC PANEL: CPT

## 2023-08-21 PROCEDURE — 85025 COMPLETE CBC W/AUTO DIFF WBC: CPT

## 2023-08-21 RX ORDER — 0.9 % SODIUM CHLORIDE 0.9 %
INTRAVENOUS SOLUTION INTRAVENOUS
Status: DISCONTINUED
Start: 2023-08-21 | End: 2023-08-21

## 2023-08-21 RX ORDER — ERTAPENEM 1 G/1
INJECTION, POWDER, LYOPHILIZED, FOR SOLUTION INTRAMUSCULAR; INTRAVENOUS
Status: DISCONTINUED
Start: 2023-08-21 | End: 2023-08-21

## 2023-08-21 NOTE — PROGRESS NOTES
Gloria to infusion today for daily Invanz to treat perforated appendicitis. She arrives alert and independent. Denies pain, no complaints. Midline flushes easily with NS and has positive blood return. Weekly labs drawn and sent as ordered- CBC and CMP. Midline flushed and Invanz given. Patient tolerated well, no s/s of adverse reaction noted or reported. Midline saline locked and alcohol cap placed. Patient discharged in stable condition from infusion, aware of future appointments.      EOT: 8/25/23  IR F/U: 8/24/23

## 2023-08-22 ENCOUNTER — NURSE ONLY (OUTPATIENT)
Dept: HEMATOLOGY/ONCOLOGY | Facility: HOSPITAL | Age: 28
End: 2023-08-22
Attending: INTERNAL MEDICINE
Payer: COMMERCIAL

## 2023-08-22 VITALS
DIASTOLIC BLOOD PRESSURE: 84 MMHG | SYSTOLIC BLOOD PRESSURE: 134 MMHG | RESPIRATION RATE: 18 BRPM | HEART RATE: 96 BPM | TEMPERATURE: 98 F

## 2023-08-22 DIAGNOSIS — K35.32 PERFORATED APPENDICITIS: Primary | ICD-10-CM

## 2023-08-22 PROCEDURE — 96365 THER/PROPH/DIAG IV INF INIT: CPT

## 2023-08-22 RX ORDER — 0.9 % SODIUM CHLORIDE 0.9 %
INTRAVENOUS SOLUTION INTRAVENOUS
Status: DISCONTINUED
Start: 2023-08-22 | End: 2023-08-22

## 2023-08-22 RX ORDER — ERTAPENEM 1 G/1
INJECTION, POWDER, LYOPHILIZED, FOR SOLUTION INTRAMUSCULAR; INTRAVENOUS
Status: DISCONTINUED
Start: 2023-08-22 | End: 2023-08-22

## 2023-08-22 RX ORDER — ACETAMINOPHEN 325 MG/1
325 TABLET ORAL EVERY 6 HOURS PRN
COMMUNITY

## 2023-08-22 NOTE — PROGRESS NOTES
Emilie Presley is here for daily Tonia Quince for a perforated Appendicitis. She is aware that she will need to contact Dr. Yeison Machuca office for order for midline discontinuation at the end of treatment. Gloria tolerated infusion well and discharged stable.     EOT-8/25  F/U Surgery-9/5

## 2023-08-23 ENCOUNTER — TELEPHONE (OUTPATIENT)
Dept: INTERNAL MEDICINE CLINIC | Facility: CLINIC | Age: 28
End: 2023-08-23

## 2023-08-23 ENCOUNTER — NURSE ONLY (OUTPATIENT)
Dept: HEMATOLOGY/ONCOLOGY | Facility: HOSPITAL | Age: 28
End: 2023-08-23
Attending: INTERNAL MEDICINE
Payer: COMMERCIAL

## 2023-08-23 VITALS
HEART RATE: 102 BPM | SYSTOLIC BLOOD PRESSURE: 118 MMHG | RESPIRATION RATE: 18 BRPM | TEMPERATURE: 98 F | DIASTOLIC BLOOD PRESSURE: 71 MMHG

## 2023-08-23 DIAGNOSIS — K35.32 PERFORATED APPENDIX: Primary | ICD-10-CM

## 2023-08-23 DIAGNOSIS — K35.32 RUPTURED APPENDIX: ICD-10-CM

## 2023-08-23 DIAGNOSIS — K35.32 PERFORATED APPENDICITIS: Primary | ICD-10-CM

## 2023-08-23 DIAGNOSIS — L03.039 ONYCHIA OF TOE, UNSPECIFIED LATERALITY: ICD-10-CM

## 2023-08-23 DIAGNOSIS — L03.039 PARONYCHIA OF TOE, UNSPECIFIED LATERALITY: ICD-10-CM

## 2023-08-23 PROCEDURE — 96365 THER/PROPH/DIAG IV INF INIT: CPT

## 2023-08-23 RX ORDER — ERTAPENEM 1 G/1
INJECTION, POWDER, LYOPHILIZED, FOR SOLUTION INTRAMUSCULAR; INTRAVENOUS
Status: DISCONTINUED
Start: 2023-08-23 | End: 2023-08-23

## 2023-08-23 RX ORDER — 0.9 % SODIUM CHLORIDE 0.9 %
INTRAVENOUS SOLUTION INTRAVENOUS
Status: DISCONTINUED
Start: 2023-08-23 | End: 2023-08-23

## 2023-08-23 NOTE — PROGRESS NOTES
Patient arrives ambulatory to infusion for daily antibiotic therapy Invanz. Denies new complaints or concerns to this RN. Midline present to right upper arm, flushes without resistance, + blood return noted. Invanz infused per order, patient tolerated well. Midline flushed with NS, alcohol cap applied. Patient discharged stable aware of upcoming appts.      IR F/U: 8/24/23  EOT: 8/25/23

## 2023-08-23 NOTE — TELEPHONE ENCOUNTER
Patient is requesting referral.     Name of specialist and specialty department :    Dr Roscoe Leblanc  Reason for visit with the specialist:  [facility] appt 8-24-23 with Dr Leida Cao disease doctor  to see if ready to remove midline in arm  Address of the specialist office: [address]  Fax [phone]  Appointment date:  8-24-23         CSS informed patient the turnaround time for referral is 5-7 business days. Patient was informed to check their [employer] account for referral status.

## 2023-08-24 ENCOUNTER — APPOINTMENT (OUTPATIENT)
Dept: CT IMAGING | Facility: HOSPITAL | Age: 28
End: 2023-08-24
Attending: CLINICAL NURSE SPECIALIST
Payer: COMMERCIAL

## 2023-08-24 ENCOUNTER — HOSPITAL ENCOUNTER (OUTPATIENT)
Dept: INTERVENTIONAL RADIOLOGY/VASCULAR | Facility: HOSPITAL | Age: 28
Discharge: HOME | End: 2023-08-24
Attending: NURSE PRACTITIONER | Admitting: RADIOLOGY
Payer: COMMERCIAL

## 2023-08-24 ENCOUNTER — NURSE ONLY (OUTPATIENT)
Dept: HEMATOLOGY/ONCOLOGY | Facility: HOSPITAL | Age: 28
End: 2023-08-24
Attending: INTERNAL MEDICINE
Payer: COMMERCIAL

## 2023-08-24 VITALS
BODY MASS INDEX: 33.62 KG/M2 | DIASTOLIC BLOOD PRESSURE: 81 MMHG | HEART RATE: 84 BPM | RESPIRATION RATE: 28 BRPM | OXYGEN SATURATION: 96 % | SYSTOLIC BLOOD PRESSURE: 118 MMHG | TEMPERATURE: 98 F | WEIGHT: 227 LBS | HEIGHT: 69 IN

## 2023-08-24 VITALS
DIASTOLIC BLOOD PRESSURE: 82 MMHG | RESPIRATION RATE: 18 BRPM | HEART RATE: 91 BPM | SYSTOLIC BLOOD PRESSURE: 124 MMHG | OXYGEN SATURATION: 98 % | TEMPERATURE: 99 F

## 2023-08-24 DIAGNOSIS — K35.32 PERFORATED APPENDICITIS: Primary | ICD-10-CM

## 2023-08-24 DIAGNOSIS — K35.32 PERFORATED APPENDICITIS: ICD-10-CM

## 2023-08-24 PROCEDURE — 72192 CT PELVIS W/O DYE: CPT | Performed by: CLINICAL NURSE SPECIALIST

## 2023-08-24 PROCEDURE — 49424 ASSESS CYST CONTRAST INJECT: CPT | Performed by: RADIOLOGY

## 2023-08-24 PROCEDURE — 96365 THER/PROPH/DIAG IV INF INIT: CPT

## 2023-08-24 PROCEDURE — BW111ZZ FLUOROSCOPY OF ABDOMEN AND PELVIS USING LOW OSMOLAR CONTRAST: ICD-10-PCS | Performed by: STUDENT IN AN ORGANIZED HEALTH CARE EDUCATION/TRAINING PROGRAM

## 2023-08-24 PROCEDURE — 76080 X-RAY EXAM OF FISTULA: CPT | Performed by: RADIOLOGY

## 2023-08-24 RX ORDER — ACETAMINOPHEN 500 MG
TABLET ORAL
Status: DISCONTINUED
Start: 2023-08-24 | End: 2023-08-24

## 2023-08-24 RX ORDER — 0.9 % SODIUM CHLORIDE 0.9 %
INTRAVENOUS SOLUTION INTRAVENOUS
Status: DISCONTINUED
Start: 2023-08-24 | End: 2023-08-24

## 2023-08-24 RX ORDER — ACETAMINOPHEN 500 MG
1000 TABLET ORAL EVERY 4 HOURS PRN
Refills: 0 | Status: CANCELLED
Start: 2023-08-24

## 2023-08-24 RX ORDER — ERTAPENEM 1 G/1
INJECTION, POWDER, LYOPHILIZED, FOR SOLUTION INTRAMUSCULAR; INTRAVENOUS
Status: DISCONTINUED
Start: 2023-08-24 | End: 2023-08-24

## 2023-08-24 NOTE — PROGRESS NOTES
Patient presents for infusion of daily Invanz for appendicitis. Had IR follow up today - drain was not pulled. Patient stated that infectious disease may want to extend antibiotics due to drain still being in place. She called over to ID during visit today to leave message. Informed patient to follow up tomorrow so that she may get on the schedule for the weekend. Patient verbalized understanding. Midline present with + blood return. Infusion tolerated without issues. Will f/u tomorrow regarding new EOT. Discharged in stable condition.

## 2023-08-24 NOTE — DISCHARGE INSTRUCTIONS
Pr-14  4.2  [phone]     Patient Name:  Alex Jackson    Procedure:  Drain Check    Site Care: Do not remove the dressing over the catheter/tube. Please contact Interventional Radiology for concerns related to this dressing. Activity/Diet  No heavy lifting or strenuous activity for 48 hours. Drink plenty of fluids, unless you have otherwise been told to restrict your fluid intake. Do not drink alcohol for 24 hours. Do not drive,  operate heavy machinery, make important decisions or sign legal documents today. Medications: Take acetaminophen if needed for pain. Do not exceed 4000mg of acetaminophen in a 24-hour period. and Make no changes to your existing medications. Contact Interventional Radiology at [phone] if you have severe/unrelieved pain, fever, chills, dizziness/lightheadedness, or drainage/bleeding from your incision site.

## 2023-08-24 NOTE — IVS NOTE
DISCHARGE NOTE    1, PATIENT AWAKE AND ALERT X 4    2. REYES, VSS, NO PONV, NO PAIN    3. INSTRUCTIONS GIVEN TO PATIENT     4. Ipatient has midline PICC    5.           SPOKE WITH PATIENT AND FAMILY POST PROCEDURE    6. PATIENT ABLE TO DRINK, [address], VOID    7. PROCEDURAL SITE REMAINS DRY, INTACT WITH GOOD CIRCULATION,    MOVEMENT AND SENSATION    8. Gary Mast in one week for repeat drain check    9. PATIENT DISCHARGED walked out    10.  NEW PRESCRIPTION: n/a

## 2023-08-25 ENCOUNTER — TELEPHONE (OUTPATIENT)
Dept: INTERVENTIONAL RADIOLOGY/VASCULAR | Facility: HOSPITAL | Age: 28
End: 2023-08-25

## 2023-08-25 ENCOUNTER — NURSE ONLY (OUTPATIENT)
Dept: HEMATOLOGY/ONCOLOGY | Facility: HOSPITAL | Age: 28
End: 2023-08-25
Attending: INTERNAL MEDICINE
Payer: COMMERCIAL

## 2023-08-25 VITALS
SYSTOLIC BLOOD PRESSURE: 116 MMHG | DIASTOLIC BLOOD PRESSURE: 65 MMHG | HEART RATE: 122 BPM | OXYGEN SATURATION: 98 % | RESPIRATION RATE: 18 BRPM | TEMPERATURE: 98 F

## 2023-08-25 DIAGNOSIS — K35.32 PERFORATED APPENDICITIS: Primary | ICD-10-CM

## 2023-08-25 PROCEDURE — 96365 THER/PROPH/DIAG IV INF INIT: CPT

## 2023-08-25 RX ORDER — 0.9 % SODIUM CHLORIDE 0.9 %
INTRAVENOUS SOLUTION INTRAVENOUS
Status: DISCONTINUED
Start: 2023-08-25 | End: 2023-08-25

## 2023-08-25 RX ORDER — ERTAPENEM 1 G/1
INJECTION, POWDER, LYOPHILIZED, FOR SOLUTION INTRAMUSCULAR; INTRAVENOUS
Status: DISCONTINUED
Start: 2023-08-25 | End: 2023-08-25

## 2023-08-25 NOTE — INTERVAL H&P NOTE
The above referenced H&P was reviewed by Maury Negro MD on 8/25/2023, the patient was examined and no significant changes have occurred in the patient's condition since the H&P was performed. Risks, benefits, alternative treatments and consequences of no treatment were discussed. We will proceed with procedure as planned.       Maury Negro MD  8/25/2023  8:53 AM

## 2023-08-25 NOTE — PROGRESS NOTES
Pt to infusion for daily abx. Arrives ambulating independently. Pt stating she has been having increased pain at EARLINE drain since contrast was placed in it yesterday. Per pt, they capped the drain off and did not reattach a bulb to it. IR notified, APN came to place bulb on end of drain. Pt informed if pain does not improve by tomorrow, report to ED for further evaluation. Pt stated understanding. Midline to right arm - present blood return noted. Invanz infused per order, pt appeared to tolerate well. Midline removed per order, pressure applied to site for 5 minutes, pressure dressing left in place. 10 cm of length noted from catheter. Pt informed to leave dressing on for 24 hours and if whole bandage becomes saturated with blood to report to ED. Pt stated understanding. Discharged from infusion ambulating independently.

## 2023-08-26 ENCOUNTER — APPOINTMENT (OUTPATIENT)
Dept: CT IMAGING | Facility: HOSPITAL | Age: 28
End: 2023-08-26
Attending: EMERGENCY MEDICINE
Payer: COMMERCIAL

## 2023-08-26 ENCOUNTER — HOSPITAL ENCOUNTER (EMERGENCY)
Facility: HOSPITAL | Age: 28
Discharge: HOME | End: 2023-08-26
Attending: EMERGENCY MEDICINE
Payer: COMMERCIAL

## 2023-08-26 VITALS
HEART RATE: 92 BPM | SYSTOLIC BLOOD PRESSURE: 135 MMHG | HEIGHT: 69 IN | WEIGHT: 227 LBS | BODY MASS INDEX: 33.62 KG/M2 | TEMPERATURE: 98 F | RESPIRATION RATE: 17 BRPM | DIASTOLIC BLOOD PRESSURE: 97 MMHG | OXYGEN SATURATION: 95 %

## 2023-08-26 DIAGNOSIS — R10.31 ABDOMINAL PAIN, RIGHT LOWER QUADRANT: Primary | ICD-10-CM

## 2023-08-26 LAB
ALBUMIN SERPL-MCNC: 3.8 G/DL (ref 3.4–5)
ALBUMIN/GLOB SERPL: 0.8 {RATIO} (ref 1–2)
ALP LIVER SERPL-CCNC: 86 U/L
ALT SERPL-CCNC: 65 U/L
ANION GAP SERPL CALC-SCNC: 6 MMOL/L (ref 0–18)
AST SERPL-CCNC: 47 U/L (ref 15–37)
B-HCG UR QL: NEGATIVE
BASOPHILS # BLD AUTO: 0.04 X10(3) UL (ref 0–0.2)
BASOPHILS NFR BLD AUTO: 0.3 %
BILIRUB SERPL-MCNC: 0.9 MG/DL (ref 0.1–2)
BUN BLD-MCNC: 12 MG/DL (ref 7–18)
BUN/CREAT SERPL: 12.1 (ref 10–20)
CALCIUM BLD-MCNC: 9.6 MG/DL (ref 8.5–10.1)
CHLORIDE SERPL-SCNC: 103 MMOL/L (ref 98–112)
CO2 SERPL-SCNC: 28 MMOL/L (ref 21–32)
CREAT BLD-MCNC: 0.99 MG/DL
DEPRECATED RDW RBC AUTO: 44.7 FL (ref 35.1–46.3)
EGFRCR SERPLBLD CKD-EPI 2021: 81 ML/MIN/1.73M2 (ref 60–?)
EOSINOPHIL # BLD AUTO: 0.15 X10(3) UL (ref 0–0.7)
EOSINOPHIL NFR BLD AUTO: 1.2 %
ERYTHROCYTE [DISTWIDTH] IN BLOOD BY AUTOMATED COUNT: 13.7 % (ref 11–15)
GLOBULIN PLAS-MCNC: 4.9 G/DL (ref 2.8–4.4)
GLUCOSE BLD-MCNC: 82 MG/DL (ref 70–99)
HCT VFR BLD AUTO: 43.6 %
HGB BLD-MCNC: 14.4 G/DL
IMM GRANULOCYTES # BLD AUTO: 0.04 X10(3) UL (ref 0–1)
IMM GRANULOCYTES NFR BLD: 0.3 %
LYMPHOCYTES # BLD AUTO: 2.79 X10(3) UL (ref 1–4)
LYMPHOCYTES NFR BLD AUTO: 22.1 %
MCH RBC QN AUTO: 29.4 PG (ref 26–34)
MCHC RBC AUTO-ENTMCNC: 33 G/DL (ref 31–37)
MCV RBC AUTO: 89 FL
MONOCYTES # BLD AUTO: 0.85 X10(3) UL (ref 0.1–1)
MONOCYTES NFR BLD AUTO: 6.7 %
NEUTROPHILS # BLD AUTO: 8.77 X10 (3) UL (ref 1.5–7.7)
NEUTROPHILS # BLD AUTO: 8.77 X10(3) UL (ref 1.5–7.7)
NEUTROPHILS NFR BLD AUTO: 69.4 %
OSMOLALITY SERPL CALC.SUM OF ELEC: 283 MOSM/KG (ref 275–295)
PLATELET # BLD AUTO: 324 10(3)UL (ref 150–450)
POTASSIUM SERPL-SCNC: 4 MMOL/L (ref 3.5–5.1)
PROT SERPL-MCNC: 8.7 G/DL (ref 6.4–8.2)
RBC # BLD AUTO: 4.9 X10(6)UL
SODIUM SERPL-SCNC: 137 MMOL/L (ref 136–145)
WBC # BLD AUTO: 12.6 X10(3) UL (ref 4–11)

## 2023-08-26 PROCEDURE — 74177 CT ABD & PELVIS W/CONTRAST: CPT | Performed by: EMERGENCY MEDICINE

## 2023-08-26 PROCEDURE — 99284 EMERGENCY DEPT VISIT MOD MDM: CPT

## 2023-08-26 PROCEDURE — 96361 HYDRATE IV INFUSION ADD-ON: CPT

## 2023-08-26 PROCEDURE — 81025 URINE PREGNANCY TEST: CPT

## 2023-08-26 PROCEDURE — 80053 COMPREHEN METABOLIC PANEL: CPT | Performed by: EMERGENCY MEDICINE

## 2023-08-26 PROCEDURE — 96360 HYDRATION IV INFUSION INIT: CPT

## 2023-08-26 PROCEDURE — 85025 COMPLETE CBC W/AUTO DIFF WBC: CPT | Performed by: EMERGENCY MEDICINE

## 2023-08-26 NOTE — ED INITIAL ASSESSMENT (HPI)
Pt presents s/p discovery of perforated appendix three weeks ago. Pt reports EARLINE drain was placed with follow up this week discovering a fistula between colon and appendix. Pt reports contrast injected into EARLINE drain and pt has been experiencing cramping and sharp pains since. Pt has been receiving IV abx. Pt denies recent fevers.

## 2023-08-27 NOTE — ED QUICK NOTES
Patient provided with discharge instructions. Verbalized understanding for plan of care at home and at follow-up. All questions/concerns addressed prior to discharge.

## 2023-08-28 ENCOUNTER — PATIENT OUTREACH (OUTPATIENT)
Dept: CASE MANAGEMENT | Age: 28
End: 2023-08-28

## 2023-08-28 VITALS — WEIGHT: 227 LBS | BODY MASS INDEX: 33.62 KG/M2 | HEIGHT: 69 IN

## 2023-08-28 NOTE — TELEPHONE ENCOUNTER
Hello     Please sign off if you agree with plan of care.      Thank you,  [facility]  Referral specialist

## 2023-08-28 NOTE — PROGRESS NOTES
Spoke with patient who is having some pain at abscess drain site. CT reviewed by Dr Ganesh Herrera, drain in proper position, fistula noted. Drain check on 8/31. Follow up with surgeon.

## 2023-08-29 ENCOUNTER — TELEPHONE (OUTPATIENT)
Dept: SURGERY | Facility: CLINIC | Age: 28
End: 2023-08-29

## 2023-08-31 ENCOUNTER — HOSPITAL ENCOUNTER (OUTPATIENT)
Dept: INTERVENTIONAL RADIOLOGY/VASCULAR | Facility: HOSPITAL | Age: 28
Discharge: HOME | End: 2023-08-31
Attending: RADIOLOGY
Payer: COMMERCIAL

## 2023-09-05 ENCOUNTER — OFFICE VISIT (OUTPATIENT)
Dept: SURGERY | Facility: CLINIC | Age: 28
End: 2023-09-05
Payer: COMMERCIAL

## 2023-09-05 VITALS
RESPIRATION RATE: 16 BRPM | DIASTOLIC BLOOD PRESSURE: 88 MMHG | WEIGHT: 222 LBS | HEART RATE: 116 BPM | SYSTOLIC BLOOD PRESSURE: 133 MMHG | BODY MASS INDEX: 32.88 KG/M2 | OXYGEN SATURATION: 97 % | HEIGHT: 69 IN | TEMPERATURE: 98 F

## 2023-09-05 DIAGNOSIS — K35.32 PERFORATED APPENDICITIS: Primary | ICD-10-CM

## 2023-09-05 PROCEDURE — 3075F SYST BP GE 130 - 139MM HG: CPT | Performed by: SURGERY

## 2023-09-05 PROCEDURE — 99214 OFFICE O/P EST MOD 30 MIN: CPT | Performed by: SURGERY

## 2023-09-05 PROCEDURE — 3079F DIAST BP 80-89 MM HG: CPT | Performed by: SURGERY

## 2023-09-05 PROCEDURE — 3008F BODY MASS INDEX DOCD: CPT | Performed by: SURGERY

## 2023-09-05 RX ORDER — METRONIDAZOLE 500 MG/1
500 TABLET ORAL EVERY 12 HOURS
COMMUNITY
Start: 2023-08-25

## 2023-09-05 RX ORDER — LEVOFLOXACIN 750 MG/1
TABLET ORAL
COMMUNITY
Start: 2023-08-25

## 2023-09-05 NOTE — PATIENT INSTRUCTIONS
Surgery: XI Robot-assisted, laparoscopic, possibly open, appendectomy, drain removal     Date of Surgery: [facility]    Hospital:     [address] [place] [address]   Phone: [address] [facility].Armando Andrae Bier   Phone: [phone]    This is an outpatient procedure. Use the provided Chlorhexadine surgical soap(instructions attached) to shower the night before and morning of your procedure. Do not apply powders, creams, lotions or deodorant after showering. Do not apply any kind of makeup and make sure to remove nail polish prior to your surgery. For faster recovery from anesthesia and surgery please follow the instructions below regarding your pre-op diet:  12 hours prior to your surgery time you are to drink one 10oz bottle of Ensure Pre-Surgery Drink. You are to have NO solid food or water after 11pm the night before your surgery EXCEPT one additional 10oz bottle of Ensure Pre-Surgery Drink. You need to finish drinking this 4 hours prior to surgery time. Bring your picture ID and insurance card with you. Wear comfortable clothing that can easily be removed. Preferably, something that zips, snaps, or buttons up the front. You will be contacted by the hospital  for pre-admission COVID-19 testing and the day prior to your surgery to confirm details and give you specific instructions about when and where to arrive the day of your procedure. If you are taking blood thinners including: Plavix, Eliquis, Coumadin you will need to contact the prescribing provider for specific instructions on holding these medications for your procedure  Motrin, Advil, Ibuprofen, Aspirin, Baby Aspirin and Fish Oil are also blood thinners and need to be held at least one week prior to your procedure. It is okay to take Tylenol. Inform your primary care physician of your surgery and ask if him/her will need to see you prior to surgery.       Pre-Operative Testing   CBC  CMP  BMP    PT, PTT, INR  UA  EKG    Chest X-Ray  Cardiac Clearance  H & P Medical Clearance     Dr. Melony John nurse direct line:  [phone]  Monday through Friday 8:30 am to 4:30 pm    For Dr. Melony John office: [phone]/ Fax: [phone]  After hours you will reach the answering service     Central Schedulin[address] [place] [address], [address] [place] [address] Records:   [phone]

## 2023-09-07 ENCOUNTER — TELEPHONE (OUTPATIENT)
Dept: SURGERY | Facility: CLINIC | Age: 28
End: 2023-09-07

## 2023-09-07 DIAGNOSIS — K35.32 PERFORATED APPENDICITIS: Primary | ICD-10-CM

## 2023-09-07 NOTE — TELEPHONE ENCOUNTER
Calling pt in regards to scheduling surgery. Informed pt that I have 9/18/23 available at [facility] with Dr. Corey Walters. Pt verbalized understanding and in agreement with date and location. All questions answered. Encouraged pt to call or [employer] message office with any other questions or concerns.

## 2023-09-08 ENCOUNTER — NURSE TRIAGE (OUTPATIENT)
Dept: INTERNAL MEDICINE CLINIC | Facility: CLINIC | Age: 28
End: 2023-09-08

## 2023-09-08 ENCOUNTER — OFFICE VISIT (OUTPATIENT)
Dept: INTERVENTIONAL RADIOLOGY/VASCULAR | Facility: HOSPITAL | Age: 28
End: 2023-09-08
Attending: CLINICAL NURSE SPECIALIST
Payer: COMMERCIAL

## 2023-09-08 DIAGNOSIS — K35.32 PERFORATED APPENDICITIS: Primary | ICD-10-CM

## 2023-09-08 NOTE — TELEPHONE ENCOUNTER
Patient indicated that she had a drain put in after a ruptured appendicitis in the right lower abdomen. Patient's dressing came off. Still has the patch on but concerned. Drain is fine. Does not want the drain to come off. No other symptoms or concerns. Patient did call Dr Jaret oropeza yesterday and on call was paged but no one ever called her back and tried this morning but only got the voice mail. Needs to know what to do? Advised patient to try contacting Dr Jaret oropeza again, but will also send a message to Dr Kati Rodriguez for any other recommendations. If does not hear back from Dr Mo's office to go to the ER. Called Dr Jaret oropeza and spoke to Dr Jaret Paredes she will let the APN know to see if can see the patient today. Olvin Caceres that ok to leave a detailed message on patient's voicemail.

## 2023-09-08 NOTE — TELEPHONE ENCOUNTER
I agree with plan patient to be seen in Dr. Camacho Alvarado office today if possible    patient has appointment today at 3:00 with somebody not sure with who ?     If patient cannot be seen by the Dr. Jj Mejias office today recommend  to go to emergency room for eval anf th.    Recommend patient also to schedule follow-up appointment did not see her after the hospitalization

## 2023-09-08 NOTE — TELEPHONE ENCOUNTER
Received secure chat from [place] [phone] from Dr Philip Bush office: Hi we've been trying to reach the patient but she is not answering. I can see her today     just got a hold of the mom, patient is supposed to see us at 3 pm     Left detailed message on patient's voicemail of above information. If any questions to call us back or contact Dr Philip Bush office.

## 2023-09-08 NOTE — PROGRESS NOTES
Patient needs EARLINE drain dressing changed. Old dressing is falling off. New sorbaview dressing applied. Extra supplies given.   She is having robotic assisted possible open appendectomy, possible takedown of fistula 9/18/23

## 2023-09-13 RX ORDER — SODIUM CHLORIDE, SODIUM LACTATE, POTASSIUM CHLORIDE, CALCIUM CHLORIDE 600; 310; 30; 20 MG/100ML; MG/100ML; MG/100ML; MG/100ML
INJECTION, SOLUTION INTRAVENOUS CONTINUOUS
Status: CANCELLED | OUTPATIENT
Start: 2023-09-13

## 2023-09-18 ENCOUNTER — ANESTHESIA EVENT (OUTPATIENT)
Dept: SURGERY | Facility: HOSPITAL | Age: 28
End: 2023-09-18
Payer: COMMERCIAL

## 2023-09-18 ENCOUNTER — ANESTHESIA (OUTPATIENT)
Dept: SURGERY | Facility: HOSPITAL | Age: 28
End: 2023-09-18
Payer: COMMERCIAL

## 2023-09-18 ENCOUNTER — HOSPITAL ENCOUNTER (OUTPATIENT)
Facility: HOSPITAL | Age: 28
Setting detail: HOSPITAL OUTPATIENT SURGERY
Discharge: HOME | End: 2023-09-18
Attending: SURGERY | Admitting: SURGERY
Payer: COMMERCIAL

## 2023-09-18 VITALS
SYSTOLIC BLOOD PRESSURE: 129 MMHG | RESPIRATION RATE: 18 BRPM | DIASTOLIC BLOOD PRESSURE: 93 MMHG | TEMPERATURE: 98 F | HEIGHT: 69 IN | HEART RATE: 96 BPM | OXYGEN SATURATION: 90 % | BODY MASS INDEX: 32.88 KG/M2 | WEIGHT: 222 LBS

## 2023-09-18 DIAGNOSIS — K35.32 RUPTURED APPENDICITIS: Primary | ICD-10-CM

## 2023-09-18 DIAGNOSIS — K35.32 PERFORATED APPENDICITIS: ICD-10-CM

## 2023-09-18 LAB — B-HCG UR QL: NEGATIVE

## 2023-09-18 PROCEDURE — 8E0W4CZ ROBOTIC ASSISTED PROCEDURE OF TRUNK REGION, PERCUTANEOUS ENDOSCOPIC APPROACH: ICD-10-PCS | Performed by: SURGERY

## 2023-09-18 PROCEDURE — 88304 TISSUE EXAM BY PATHOLOGIST: CPT | Performed by: SURGERY

## 2023-09-18 PROCEDURE — 0DTJ4ZZ RESECTION OF APPENDIX, PERCUTANEOUS ENDOSCOPIC APPROACH: ICD-10-PCS | Performed by: SURGERY

## 2023-09-18 PROCEDURE — 81025 URINE PREGNANCY TEST: CPT

## 2023-09-18 RX ORDER — MEPERIDINE HYDROCHLORIDE 25 MG/ML
12.5 INJECTION INTRAMUSCULAR; INTRAVENOUS; SUBCUTANEOUS AS NEEDED
Status: DISCONTINUED | OUTPATIENT
Start: 2023-09-18 | End: 2023-09-18

## 2023-09-18 RX ORDER — SODIUM CHLORIDE, SODIUM LACTATE, POTASSIUM CHLORIDE, CALCIUM CHLORIDE 600; 310; 30; 20 MG/100ML; MG/100ML; MG/100ML; MG/100ML
INJECTION, SOLUTION INTRAVENOUS CONTINUOUS PRN
Status: DISCONTINUED | OUTPATIENT
Start: 2023-09-18 | End: 2023-09-18 | Stop reason: SURG

## 2023-09-18 RX ORDER — SODIUM CHLORIDE 9 MG/ML
INJECTION, SOLUTION INTRAVENOUS CONTINUOUS
Status: DISCONTINUED | OUTPATIENT
Start: 2023-09-18 | End: 2023-09-18

## 2023-09-18 RX ORDER — PROCHLORPERAZINE EDISYLATE 5 MG/ML
5 INJECTION INTRAMUSCULAR; INTRAVENOUS EVERY 8 HOURS PRN
Status: DISCONTINUED | OUTPATIENT
Start: 2023-09-18 | End: 2023-09-18

## 2023-09-18 RX ORDER — TRAMADOL HYDROCHLORIDE 50 MG/1
TABLET ORAL EVERY 4 HOURS PRN
Qty: 20 TABLET | Refills: 0 | Status: SHIPPED | OUTPATIENT
Start: 2023-09-18

## 2023-09-18 RX ORDER — MORPHINE SULFATE 10 MG/ML
6 INJECTION, SOLUTION INTRAMUSCULAR; INTRAVENOUS EVERY 10 MIN PRN
Status: DISCONTINUED | OUTPATIENT
Start: 2023-09-18 | End: 2023-09-18

## 2023-09-18 RX ORDER — METOCLOPRAMIDE 10 MG/1
10 TABLET ORAL ONCE
Status: COMPLETED | OUTPATIENT
Start: 2023-09-18 | End: 2023-09-18

## 2023-09-18 RX ORDER — DEXAMETHASONE SODIUM PHOSPHATE 4 MG/ML
VIAL (ML) INJECTION AS NEEDED
Status: DISCONTINUED | OUTPATIENT
Start: 2023-09-18 | End: 2023-09-18 | Stop reason: SURG

## 2023-09-18 RX ORDER — LIDOCAINE HYDROCHLORIDE 10 MG/ML
INJECTION, SOLUTION EPIDURAL; INFILTRATION; INTRACAUDAL; PERINEURAL AS NEEDED
Status: DISCONTINUED | OUTPATIENT
Start: 2023-09-18 | End: 2023-09-18 | Stop reason: SURG

## 2023-09-18 RX ORDER — HYDROMORPHONE HYDROCHLORIDE 1 MG/ML
0.2 INJECTION, SOLUTION INTRAMUSCULAR; INTRAVENOUS; SUBCUTANEOUS EVERY 5 MIN PRN
Status: DISCONTINUED | OUTPATIENT
Start: 2023-09-18 | End: 2023-09-18

## 2023-09-18 RX ORDER — NALOXONE HYDROCHLORIDE 0.4 MG/ML
80 INJECTION, SOLUTION INTRAMUSCULAR; INTRAVENOUS; SUBCUTANEOUS AS NEEDED
Status: DISCONTINUED | OUTPATIENT
Start: 2023-09-18 | End: 2023-09-18

## 2023-09-18 RX ORDER — AMOXICILLIN AND CLAVULANATE POTASSIUM 875; 125 MG/1; MG/1
1 TABLET, FILM COATED ORAL 2 TIMES DAILY
Qty: 14 TABLET | Refills: 0 | Status: SHIPPED | OUTPATIENT
Start: 2023-09-18 | End: 2023-09-23

## 2023-09-18 RX ORDER — SODIUM CHLORIDE, SODIUM LACTATE, POTASSIUM CHLORIDE, CALCIUM CHLORIDE 600; 310; 30; 20 MG/100ML; MG/100ML; MG/100ML; MG/100ML
INJECTION, SOLUTION INTRAVENOUS CONTINUOUS
Status: DISCONTINUED | OUTPATIENT
Start: 2023-09-18 | End: 2023-09-18

## 2023-09-18 RX ORDER — FAMOTIDINE 20 MG/1
20 TABLET, FILM COATED ORAL ONCE
Status: COMPLETED | OUTPATIENT
Start: 2023-09-18 | End: 2023-09-18

## 2023-09-18 RX ORDER — TRAMADOL HYDROCHLORIDE 50 MG/1
TABLET ORAL EVERY 4 HOURS PRN
Qty: 20 TABLET | Refills: 0 | Status: SHIPPED | OUTPATIENT
Start: 2023-09-18 | End: 2023-09-18

## 2023-09-18 RX ORDER — HEPARIN SODIUM 5000 [USP'U]/ML
5000 INJECTION, SOLUTION INTRAVENOUS; SUBCUTANEOUS
Status: COMPLETED | OUTPATIENT
Start: 2023-09-18 | End: 2023-09-18

## 2023-09-18 RX ORDER — ROCURONIUM BROMIDE 10 MG/ML
INJECTION, SOLUTION INTRAVENOUS AS NEEDED
Status: DISCONTINUED | OUTPATIENT
Start: 2023-09-18 | End: 2023-09-18 | Stop reason: SURG

## 2023-09-18 RX ORDER — METRONIDAZOLE 500 MG/100ML
500 INJECTION, SOLUTION INTRAVENOUS ONCE
Status: COMPLETED | OUTPATIENT
Start: 2023-09-18 | End: 2023-09-18

## 2023-09-18 RX ORDER — HYDROMORPHONE HYDROCHLORIDE 1 MG/ML
0.4 INJECTION, SOLUTION INTRAMUSCULAR; INTRAVENOUS; SUBCUTANEOUS EVERY 5 MIN PRN
Status: DISCONTINUED | OUTPATIENT
Start: 2023-09-18 | End: 2023-09-18

## 2023-09-18 RX ORDER — MORPHINE SULFATE 4 MG/ML
2 INJECTION, SOLUTION INTRAMUSCULAR; INTRAVENOUS EVERY 10 MIN PRN
Status: DISCONTINUED | OUTPATIENT
Start: 2023-09-18 | End: 2023-09-18

## 2023-09-18 RX ORDER — HYDROMORPHONE HYDROCHLORIDE 1 MG/ML
0.6 INJECTION, SOLUTION INTRAMUSCULAR; INTRAVENOUS; SUBCUTANEOUS EVERY 5 MIN PRN
Status: DISCONTINUED | OUTPATIENT
Start: 2023-09-18 | End: 2023-09-18

## 2023-09-18 RX ORDER — MORPHINE SULFATE 4 MG/ML
4 INJECTION, SOLUTION INTRAMUSCULAR; INTRAVENOUS EVERY 10 MIN PRN
Status: DISCONTINUED | OUTPATIENT
Start: 2023-09-18 | End: 2023-09-18

## 2023-09-18 RX ORDER — ONDANSETRON 2 MG/ML
4 INJECTION INTRAMUSCULAR; INTRAVENOUS EVERY 6 HOURS PRN
Status: DISCONTINUED | OUTPATIENT
Start: 2023-09-18 | End: 2023-09-18

## 2023-09-18 RX ORDER — ACETAMINOPHEN 500 MG
1000 TABLET ORAL ONCE
Status: COMPLETED | OUTPATIENT
Start: 2023-09-18 | End: 2023-09-18

## 2023-09-18 RX ORDER — TRAMADOL HYDROCHLORIDE 50 MG/1
50 TABLET ORAL ONCE AS NEEDED
Status: COMPLETED | OUTPATIENT
Start: 2023-09-18 | End: 2023-09-18

## 2023-09-18 RX ORDER — ONDANSETRON 2 MG/ML
INJECTION INTRAMUSCULAR; INTRAVENOUS AS NEEDED
Status: DISCONTINUED | OUTPATIENT
Start: 2023-09-18 | End: 2023-09-18 | Stop reason: SURG

## 2023-09-18 RX ORDER — MIDAZOLAM HYDROCHLORIDE 1 MG/ML
INJECTION INTRAMUSCULAR; INTRAVENOUS AS NEEDED
Status: DISCONTINUED | OUTPATIENT
Start: 2023-09-18 | End: 2023-09-18 | Stop reason: SURG

## 2023-09-18 RX ADMIN — LIDOCAINE HYDROCHLORIDE 50 MG: 10 INJECTION, SOLUTION EPIDURAL; INFILTRATION; INTRACAUDAL; PERINEURAL at 14:20:00

## 2023-09-18 RX ADMIN — SODIUM CHLORIDE: 9 INJECTION, SOLUTION INTRAVENOUS at 14:29:00

## 2023-09-18 RX ADMIN — ONDANSETRON 4 MG: 2 INJECTION INTRAMUSCULAR; INTRAVENOUS at 14:29:00

## 2023-09-18 RX ADMIN — METRONIDAZOLE 500 MG: 500 INJECTION, SOLUTION INTRAVENOUS at 14:34:00

## 2023-09-18 RX ADMIN — ROCURONIUM BROMIDE 40 MG: 10 INJECTION, SOLUTION INTRAVENOUS at 14:29:00

## 2023-09-18 RX ADMIN — SODIUM CHLORIDE, SODIUM LACTATE, POTASSIUM CHLORIDE, CALCIUM CHLORIDE: 600; 310; 30; 20 INJECTION, SOLUTION INTRAVENOUS at 14:13:00

## 2023-09-18 RX ADMIN — DEXAMETHASONE SODIUM PHOSPHATE 4 MG: 4 MG/ML VIAL (ML) INJECTION at 14:29:00

## 2023-09-18 RX ADMIN — ROCURONIUM BROMIDE 10 MG: 10 INJECTION, SOLUTION INTRAVENOUS at 14:20:00

## 2023-09-18 RX ADMIN — SODIUM CHLORIDE, SODIUM LACTATE, POTASSIUM CHLORIDE, CALCIUM CHLORIDE: 600; 310; 30; 20 INJECTION, SOLUTION INTRAVENOUS at 15:37:00

## 2023-09-18 RX ADMIN — MIDAZOLAM HYDROCHLORIDE 2 MG: 1 INJECTION INTRAMUSCULAR; INTRAVENOUS at 14:13:00

## 2023-09-18 RX ADMIN — ROCURONIUM BROMIDE 10 MG: 10 INJECTION, SOLUTION INTRAVENOUS at 15:16:00

## 2023-09-18 NOTE — INTERVAL H&P NOTE
Patient seen and examined. No changes to the attached history and physical are noted. 34year old female presenting today for planned robotic appe and takedown of fistula. Steffi Mills MD  Complex General Surgical Oncology  [place] [address]  Pager [phone]  [email or web address]

## 2023-09-18 NOTE — OPERATIVE REPORT
Date of operation 9/18/2023    Preoperative diagnosis:  1. Ruptured appendicitis  2. Appendiceal cutaneous fistula    Operations performed:  1.  Robotic assisted appendectomy  2. Drainage of intra-abdominal abscess and takedown of fistula    Postoperative diagnosis:  1. Same    Operating surgeon:  1. Caleb Hill MD    Assistant: 1. Surgical Assistant.: Alison Choi    Indications:  27-year-old female who was diagnosed with ruptured appendicitis. This was treated with antibiotics and percutaneous drainage. She presents for interval appendectomy after a CT scan demonstrated a persistent fistula to the appendix from the drain. Details of the operation:  Patient was brought to the operating room and laid supine on the operating table. General tracheal anesthesia was induced without complications. All pressure points were padded properly. The arms were tucked. A Garcia's catheter was inserted under sterile conditions. The abdomen was clipped prepped and draped in the standard center manner. Time was completed verifying the patient's name, procedure to be performed administration of antibiotics. Everybody in the room was in agreement. A nick in the skin was made in the left upper quadrant and a Veress needle introduced. Pneumoperitoneum was established. 4 x 8 mm working ports were placed in a straight line across the left hemiabdomen. The left most cephalad port was exchanged to a 12 accommodate a stapler. The robot was docked per usual.  Attention was directed towards the right lower quadrant. Dissection along the line of Toldt identified the drain. The drain was traced towards the appendix. It was carefully withdrawn from the appendix. Dissection then in the retroperitoneum revealed small abscess with fecalith/purulent material in that region. This was suctioned out and irrigated. I then identified the terminal ileum, base of the appendix and mesoappendix.   The mesoappendix was taken with bipolar energy. The appendix was divided with a blue load linear stapler per usual.  It was extracted through the 12 mm port with a bag. Hemostasis was excellent. The drain was withdrawn. The 12 mm port fascia was closed using 0 Vicryl under direct vision with a Vikas-Rashaun needle. The remainder of the ports were withdrawn under direct vision. There was no bleeding. The robot was undocked. Wounds were irrigated. The wounds were closed in a subcuticular manner using 4-0 Vicryl suture. A total of 20 cc of 1% lidocaine intermixed with 0.5% Marcaine with epinephrine were infiltrated. The wounds were dressed. The patient was awakened and taken to the postoperative anesthesia care into the stable state. I was present for the entire case. Tami Ellington MD  Complex General Surgical Oncology  [place] [phone]  Pager [phone]  [email or web address]

## 2023-09-19 ENCOUNTER — TELEPHONE (OUTPATIENT)
Dept: SURGERY | Facility: CLINIC | Age: 28
End: 2023-09-19

## 2023-09-19 NOTE — TELEPHONE ENCOUNTER
Post op call made to patient. Patient states she is doing well. Denies fevers, no nausea or vomiting. States pain is controlled. Patient is tolerating activity without complaints. No concerns with surgical sites. Wound care instructions provided. Path is still pending. Confirmed with patient and Dr. Ting Sarmiento patient is to take antibiotics Amoxicillin-Clavulanate BID for 7 days. Work letter faxed to patient's work at [phone] per patient request    Post op appointment scheduled with  on 10/3/23 at 8 am.    Patient agrees to call if any problems.

## 2023-09-21 RX ORDER — TRAMADOL HYDROCHLORIDE 50 MG/1
TABLET ORAL EVERY 4 HOURS PRN
Qty: 20 TABLET | Refills: 0 | Status: ON HOLD | OUTPATIENT
Start: 2023-09-21 | End: 2023-09-22

## 2023-09-22 ENCOUNTER — HOSPITAL ENCOUNTER (INPATIENT)
Facility: HOSPITAL | Age: 28
LOS: 1 days | Discharge: HOME | End: 2023-09-23
Attending: EMERGENCY MEDICINE | Admitting: HOSPITALIST
Payer: COMMERCIAL

## 2023-09-22 ENCOUNTER — APPOINTMENT (OUTPATIENT)
Dept: CT IMAGING | Facility: HOSPITAL | Age: 28
DRG: 863 | End: 2023-09-22
Attending: EMERGENCY MEDICINE
Payer: COMMERCIAL

## 2023-09-22 ENCOUNTER — APPOINTMENT (OUTPATIENT)
Dept: CT IMAGING | Facility: HOSPITAL | Age: 28
End: 2023-09-22
Attending: NURSE PRACTITIONER
Payer: COMMERCIAL

## 2023-09-22 ENCOUNTER — APPOINTMENT (OUTPATIENT)
Dept: CT IMAGING | Facility: HOSPITAL | Age: 28
End: 2023-09-22
Attending: EMERGENCY MEDICINE
Payer: COMMERCIAL

## 2023-09-22 ENCOUNTER — HOSPITAL ENCOUNTER (INPATIENT)
Facility: HOSPITAL | Age: 28
LOS: 1 days | Discharge: HOME | DRG: 863 | End: 2023-09-23
Attending: EMERGENCY MEDICINE | Admitting: HOSPITALIST
Payer: COMMERCIAL

## 2023-09-22 ENCOUNTER — APPOINTMENT (OUTPATIENT)
Dept: CT IMAGING | Facility: HOSPITAL | Age: 28
DRG: 863 | End: 2023-09-22
Attending: NURSE PRACTITIONER
Payer: COMMERCIAL

## 2023-09-22 DIAGNOSIS — T81.43XA POSTPROCEDURAL INTRAABDOMINAL ABSCESS: Primary | ICD-10-CM

## 2023-09-22 PROBLEM — K65.1 POSTPROCEDURAL INTRAABDOMINAL ABSCESS (HCC): Status: ACTIVE | Noted: 2023-09-22

## 2023-09-22 LAB
ALBUMIN SERPL-MCNC: 3.4 G/DL (ref 3.4–5)
ALBUMIN/GLOB SERPL: 0.6 {RATIO} (ref 1–2)
ALP LIVER SERPL-CCNC: 83 U/L
ALT SERPL-CCNC: 36 U/L
ANION GAP SERPL CALC-SCNC: 9 MMOL/L (ref 0–18)
AST SERPL-CCNC: 26 U/L (ref 15–37)
B-HCG UR QL: NEGATIVE
BASOPHILS # BLD AUTO: 0.04 X10(3) UL (ref 0–0.2)
BASOPHILS NFR BLD AUTO: 0.3 %
BILIRUB SERPL-MCNC: 0.7 MG/DL (ref 0.1–2)
BUN BLD-MCNC: 12 MG/DL (ref 7–18)
BUN/CREAT SERPL: 12.1 (ref 10–20)
CALCIUM BLD-MCNC: 9.8 MG/DL (ref 8.5–10.1)
CHLORIDE SERPL-SCNC: 103 MMOL/L (ref 98–112)
CO2 SERPL-SCNC: 25 MMOL/L (ref 21–32)
CREAT BLD-MCNC: 0.99 MG/DL
DEPRECATED RDW RBC AUTO: 42.4 FL (ref 35.1–46.3)
EGFRCR SERPLBLD CKD-EPI 2021: 81 ML/MIN/1.73M2 (ref 60–?)
EOSINOPHIL # BLD AUTO: 0.11 X10(3) UL (ref 0–0.7)
EOSINOPHIL NFR BLD AUTO: 0.7 %
ERYTHROCYTE [DISTWIDTH] IN BLOOD BY AUTOMATED COUNT: 13.6 % (ref 11–15)
GLOBULIN PLAS-MCNC: 5.3 G/DL (ref 2.8–4.4)
GLUCOSE BLD-MCNC: 121 MG/DL (ref 70–99)
HCT VFR BLD AUTO: 42.6 %
HGB BLD-MCNC: 14.4 G/DL
IMM GRANULOCYTES # BLD AUTO: 0.06 X10(3) UL (ref 0–1)
IMM GRANULOCYTES NFR BLD: 0.4 %
LACTATE SERPL-SCNC: 0.7 MMOL/L (ref 0.4–2)
LYMPHOCYTES # BLD AUTO: 2.48 X10(3) UL (ref 1–4)
LYMPHOCYTES NFR BLD AUTO: 16.9 %
MCH RBC QN AUTO: 29 PG (ref 26–34)
MCHC RBC AUTO-ENTMCNC: 33.8 G/DL (ref 31–37)
MCV RBC AUTO: 85.9 FL
MONOCYTES # BLD AUTO: 0.98 X10(3) UL (ref 0.1–1)
MONOCYTES NFR BLD AUTO: 6.7 %
NEUTROPHILS # BLD AUTO: 11.01 X10 (3) UL (ref 1.5–7.7)
NEUTROPHILS # BLD AUTO: 11.01 X10(3) UL (ref 1.5–7.7)
NEUTROPHILS NFR BLD AUTO: 75 %
OSMOLALITY SERPL CALC.SUM OF ELEC: 285 MOSM/KG (ref 275–295)
PLATELET # BLD AUTO: 394 10(3)UL (ref 150–450)
POTASSIUM SERPL-SCNC: 3.3 MMOL/L (ref 3.5–5.1)
PROT SERPL-MCNC: 8.7 G/DL (ref 6.4–8.2)
RBC # BLD AUTO: 4.96 X10(6)UL
SODIUM SERPL-SCNC: 137 MMOL/L (ref 136–145)
WBC # BLD AUTO: 14.7 X10(3) UL (ref 4–11)

## 2023-09-22 PROCEDURE — 99223 1ST HOSP IP/OBS HIGH 75: CPT | Performed by: HOSPITALIST

## 2023-09-22 PROCEDURE — 0W9H3ZZ DRAINAGE OF RETROPERITONEUM, PERCUTANEOUS APPROACH: ICD-10-PCS | Performed by: STUDENT IN AN ORGANIZED HEALTH CARE EDUCATION/TRAINING PROGRAM

## 2023-09-22 PROCEDURE — 99152 MOD SED SAME PHYS/QHP 5/>YRS: CPT | Performed by: NURSE PRACTITIONER

## 2023-09-22 PROCEDURE — 74177 CT ABD & PELVIS W/CONTRAST: CPT | Performed by: EMERGENCY MEDICINE

## 2023-09-22 PROCEDURE — 49406 IMAGE CATH FLUID PERI/RETRO: CPT | Performed by: NURSE PRACTITIONER

## 2023-09-22 RX ORDER — MIDAZOLAM HYDROCHLORIDE 1 MG/ML
INJECTION INTRAMUSCULAR; INTRAVENOUS
Status: COMPLETED
Start: 2023-09-22 | End: 2023-09-22

## 2023-09-22 RX ORDER — ONDANSETRON 2 MG/ML
4 INJECTION INTRAMUSCULAR; INTRAVENOUS EVERY 6 HOURS PRN
Status: DISCONTINUED | OUTPATIENT
Start: 2023-09-22 | End: 2023-09-23

## 2023-09-22 RX ORDER — MORPHINE SULFATE 4 MG/ML
4 INJECTION, SOLUTION INTRAMUSCULAR; INTRAVENOUS ONCE
Status: COMPLETED | OUTPATIENT
Start: 2023-09-22 | End: 2023-09-22

## 2023-09-22 RX ORDER — DEXTROSE AND SODIUM CHLORIDE 5; .45 G/100ML; G/100ML
INJECTION, SOLUTION INTRAVENOUS CONTINUOUS
Status: DISCONTINUED | OUTPATIENT
Start: 2023-09-22 | End: 2023-09-23

## 2023-09-22 RX ORDER — SODIUM CHLORIDE 9 MG/ML
INJECTION, SOLUTION INTRAVENOUS CONTINUOUS
Status: DISCONTINUED | OUTPATIENT
Start: 2023-09-22 | End: 2023-09-23

## 2023-09-22 RX ORDER — NALOXONE HYDROCHLORIDE 0.4 MG/ML
80 INJECTION, SOLUTION INTRAMUSCULAR; INTRAVENOUS; SUBCUTANEOUS AS NEEDED
Status: DISCONTINUED | OUTPATIENT
Start: 2023-09-22 | End: 2023-09-22

## 2023-09-22 RX ORDER — MORPHINE SULFATE 2 MG/ML
2 INJECTION, SOLUTION INTRAMUSCULAR; INTRAVENOUS EVERY 2 HOUR PRN
Status: DISCONTINUED | OUTPATIENT
Start: 2023-09-22 | End: 2023-09-23

## 2023-09-22 RX ORDER — MORPHINE SULFATE 4 MG/ML
4 INJECTION, SOLUTION INTRAMUSCULAR; INTRAVENOUS ONCE
Status: DISCONTINUED | OUTPATIENT
Start: 2023-09-22 | End: 2023-09-22

## 2023-09-22 RX ORDER — MORPHINE SULFATE 2 MG/ML
1 INJECTION, SOLUTION INTRAMUSCULAR; INTRAVENOUS EVERY 2 HOUR PRN
Status: DISCONTINUED | OUTPATIENT
Start: 2023-09-22 | End: 2023-09-23

## 2023-09-22 RX ORDER — MORPHINE SULFATE 4 MG/ML
4 INJECTION, SOLUTION INTRAMUSCULAR; INTRAVENOUS EVERY 2 HOUR PRN
Status: DISCONTINUED | OUTPATIENT
Start: 2023-09-22 | End: 2023-09-23

## 2023-09-22 RX ORDER — PROCHLORPERAZINE EDISYLATE 5 MG/ML
5 INJECTION INTRAMUSCULAR; INTRAVENOUS EVERY 8 HOURS PRN
Status: DISCONTINUED | OUTPATIENT
Start: 2023-09-22 | End: 2023-09-23

## 2023-09-22 RX ORDER — METRONIDAZOLE 500 MG/100ML
500 INJECTION, SOLUTION INTRAVENOUS ONCE
Status: COMPLETED | OUTPATIENT
Start: 2023-09-22 | End: 2023-09-22

## 2023-09-22 RX ORDER — METRONIDAZOLE 500 MG/1
500 TABLET ORAL EVERY 12 HOURS SCHEDULED
Status: DISCONTINUED | OUTPATIENT
Start: 2023-09-22 | End: 2023-09-23

## 2023-09-22 RX ORDER — ONDANSETRON 2 MG/ML
4 INJECTION INTRAMUSCULAR; INTRAVENOUS ONCE
Status: COMPLETED | OUTPATIENT
Start: 2023-09-22 | End: 2023-09-22

## 2023-09-22 RX ORDER — MIDAZOLAM HYDROCHLORIDE 1 MG/ML
1 INJECTION INTRAMUSCULAR; INTRAVENOUS EVERY 5 MIN PRN
Status: DISCONTINUED | OUTPATIENT
Start: 2023-09-22 | End: 2023-09-22

## 2023-09-22 RX ORDER — FLUMAZENIL 0.1 MG/ML
0.2 INJECTION INTRAVENOUS AS NEEDED
Status: DISCONTINUED | OUTPATIENT
Start: 2023-09-22 | End: 2023-09-22

## 2023-09-22 NOTE — CM/SW NOTE
09/22/23 1400   CM/SW Screening   Referral [phone] staff; Chart review   Patient's Current Mental Status at Time of Assessment Alert;Oriented   Patient's [address]   Patient lives with Alone   Patient Status Prior to Admission   Independent with ADLs and Mobility Yes     CM self ref to case for dc planning    CM met with pts mother at bedside, pt is out for procedure. Confirmed address on file and PCP. Pt has been back to work and driving until this week. Prior hx of inf at [address] [facility]. Per ID the dc abx plan will be determined pending cx from drainage taken today. Will attempt to dc on po abx if possible. Plan  Pending    / to remain available for support and/or discharge planning.      Shelbi Blackburn RN    Ext [phone]

## 2023-09-22 NOTE — ED INITIAL ASSESSMENT (HPI)
Pt to ed c/o constipation, vomiting  bloating and pain to surgery site. Pt states she is 3 days post appendectomy.

## 2023-09-22 NOTE — ED QUICK NOTES
Orders for admission, patient is aware of plan and ready to go upstairs. Any questions, please call ED RN Marianela Meyers at extension [phone].      Patient Covid vaccination status: Fully vaccinated     COVID Test Ordered in ED: None    COVID Suspicion at Admission: N/A    Running Infusions:  Antibiotics    Mental Status/LOC at time of transport: AOx4    Other pertinent information:   CIWA score: N/A   NIH score:  N/A

## 2023-09-22 NOTE — PROCEDURES
Interventional Radiology  Brief Post-Procedure Note    Procedure(s): drain placement    Pre-procedure diagnosis: Postprocedural intraabdominal abscess [T81.43XA]    Post-procedure diagnosis: Postprocedural intraabdominal abscess [T81.43XA]    Indication: perforated appendicits post IR drain placement, now post appendectomy, postoperative fluid collection    Anesthesia: Sedation    Findings:    -CT guided  -10 Setswana drain placed via right posterior approach  -cloudy serosanguinous output  -attached to suction bulb    Blood loss: <5 mL      Complications: None    Plan:   -flush drain with 10 mL saline daily  -monitor drain output  -when <10 mL daily output for 2 consecutive days, IR can remove drain    Please refer to full dictation under the \"Imaging\" tab in Epic.     Jonathan Villarreal MD  9/22/2023  [address] [place] [address]

## 2023-09-22 NOTE — PROGRESS NOTES
Patient received from ED. Alert and oriented x4. VSS. On room air. NPO. IVF started and K replacement per protocol. Ambulating independently, fall precautions in place. To IR for drain placement, consent signed and in chart. Mother at bedside, both aware and agreeable to POC.

## 2023-09-22 NOTE — ED QUICK NOTES
Rounding Completed    Plan of Care reviewed. Waiting for CT scan. Elimination needs assessed. Provided medication, pillow and blanket. Bed is locked and in lowest position. Call light within reach.

## 2023-09-22 NOTE — PROGRESS NOTES
Patient known to me. Recent underwent appendectomy after presenting with a perforated appendicitis. She presented with abdominal pain and was noted to have a pelvic abscess now status post drainage  She is on IV antibiotics  Continues to endorse some pain. Otherwise stable. Assessment and plan  No acute surgical issues  Drain care per IR  IV antibiotics  Advance diet to general  Please call with questions or concerns. Steffi Mills MD  [facility] General Surgical Oncology  [facility] 112  Pager [phone]  [email or web address]

## 2023-09-23 VITALS
HEIGHT: 69 IN | DIASTOLIC BLOOD PRESSURE: 72 MMHG | RESPIRATION RATE: 16 BRPM | OXYGEN SATURATION: 96 % | SYSTOLIC BLOOD PRESSURE: 119 MMHG | BODY MASS INDEX: 32.48 KG/M2 | WEIGHT: 219.31 LBS | HEART RATE: 96 BPM | TEMPERATURE: 98 F

## 2023-09-23 LAB
ANION GAP SERPL CALC-SCNC: 6 MMOL/L (ref 0–18)
BASOPHILS # BLD AUTO: 0.03 X10(3) UL (ref 0–0.2)
BASOPHILS NFR BLD AUTO: 0.4 %
BUN BLD-MCNC: 9 MG/DL (ref 7–18)
BUN/CREAT SERPL: 13.2 (ref 10–20)
CALCIUM BLD-MCNC: 9.1 MG/DL (ref 8.5–10.1)
CHLORIDE SERPL-SCNC: 108 MMOL/L (ref 98–112)
CO2 SERPL-SCNC: 25 MMOL/L (ref 21–32)
CREAT BLD-MCNC: 0.68 MG/DL
DEPRECATED RDW RBC AUTO: 44.7 FL (ref 35.1–46.3)
EGFRCR SERPLBLD CKD-EPI 2021: 123 ML/MIN/1.73M2 (ref 60–?)
EOSINOPHIL # BLD AUTO: 0.16 X10(3) UL (ref 0–0.7)
EOSINOPHIL NFR BLD AUTO: 1.9 %
ERYTHROCYTE [DISTWIDTH] IN BLOOD BY AUTOMATED COUNT: 13.9 % (ref 11–15)
GLUCOSE BLD-MCNC: 98 MG/DL (ref 70–99)
HCT VFR BLD AUTO: 36 %
HGB BLD-MCNC: 12 G/DL
IMM GRANULOCYTES # BLD AUTO: 0.05 X10(3) UL (ref 0–1)
IMM GRANULOCYTES NFR BLD: 0.6 %
LYMPHOCYTES # BLD AUTO: 1.98 X10(3) UL (ref 1–4)
LYMPHOCYTES NFR BLD AUTO: 24 %
MCH RBC QN AUTO: 29.1 PG (ref 26–34)
MCHC RBC AUTO-ENTMCNC: 33.3 G/DL (ref 31–37)
MCV RBC AUTO: 87.4 FL
MONOCYTES # BLD AUTO: 0.65 X10(3) UL (ref 0.1–1)
MONOCYTES NFR BLD AUTO: 7.9 %
NEUTROPHILS # BLD AUTO: 5.37 X10 (3) UL (ref 1.5–7.7)
NEUTROPHILS # BLD AUTO: 5.37 X10(3) UL (ref 1.5–7.7)
NEUTROPHILS NFR BLD AUTO: 65.2 %
OSMOLALITY SERPL CALC.SUM OF ELEC: 287 MOSM/KG (ref 275–295)
PLATELET # BLD AUTO: 310 10(3)UL (ref 150–450)
POTASSIUM SERPL-SCNC: 4.1 MMOL/L (ref 3.5–5.1)
POTASSIUM SERPL-SCNC: 4.1 MMOL/L (ref 3.5–5.1)
RBC # BLD AUTO: 4.12 X10(6)UL
SODIUM SERPL-SCNC: 139 MMOL/L (ref 136–145)
WBC # BLD AUTO: 8.2 X10(3) UL (ref 4–11)

## 2023-09-23 PROCEDURE — 99239 HOSP IP/OBS DSCHRG MGMT >30: CPT | Performed by: HOSPITALIST

## 2023-09-23 RX ORDER — LEVOFLOXACIN 750 MG/1
750 TABLET ORAL DAILY
Qty: 21 TABLET | Refills: 0 | Status: SHIPPED | OUTPATIENT
Start: 2023-09-23 | End: 2023-10-14

## 2023-09-23 RX ORDER — AMOXICILLIN AND CLAVULANATE POTASSIUM 875; 125 MG/1; MG/1
1 TABLET, FILM COATED ORAL 2 TIMES DAILY
Qty: 42 TABLET | Refills: 0 | Status: SHIPPED | OUTPATIENT
Start: 2023-09-23 | End: 2023-10-14

## 2023-09-23 NOTE — DISCHARGE INSTRUCTIONS
Do not take Levaquin within 2 hours of taking iron, aluminum, magnesium, or calcium-containing antacids; milk.

## 2023-09-23 NOTE — PLAN OF CARE
Patient's pain managed with Morphine. Denies of nausea. R EARLINE draining to bulb suction. Fluids running. Pending medical clearance. Safety precautions in place.      Problem: Patient Centered Care  Goal: Patient preferences are identified and integrated in the patient's plan of care  Description: Interventions:  - What would you like us to know as we care for you?   - Provide timely, complete, and accurate information to patient/family  - Incorporate patient and family knowledge, values, beliefs, and cultural backgrounds into the planning and delivery of care  - Encourage patient/family to participate in care and decision-making at the level they choose  - Honor patient and family perspectives and choices  Outcome: Progressing     Problem: PAIN - ADULT  Goal: Verbalizes/displays adequate comfort level or patient's stated pain goal  Description: INTERVENTIONS:  - Encourage pt to monitor pain and request assistance  - Assess pain using appropriate pain scale  - Administer analgesics based on type and severity of pain and evaluate response  - Implement non-pharmacological measures as appropriate and evaluate response  - Consider cultural and social influences on pain and pain management  - Manage/alleviate anxiety  - Utilize distraction and/or relaxation techniques  - Monitor for opioid side effects  - Notify MD/LIP if interventions unsuccessful or patient reports new pain  - Anticipate increased pain with activity and pre-medicate as appropriate  Outcome: Progressing     Problem: RISK FOR INFECTION - ADULT  Goal: Absence of fever/infection during anticipated neutropenic period  Description: INTERVENTIONS  - Monitor WBC  - Administer growth factors as ordered  - Implement neutropenic guidelines  Outcome: Progressing

## 2023-09-23 NOTE — PROGRESS NOTES
OK to DC from my standpoint  OK for oral antibiotics      Steffi Mills MD  [facility] General Surgical Oncology  [facility] 112  Pager [phone]  [email or web address]

## 2023-09-23 NOTE — PLAN OF CARE
Pt alert oriented times 4. No complains of pain. Denies of nausea. R EARLINE draining to bulb suction. Fluids discontinued. ID changed IV antibiotics to oral. Safety precautions in place.     Problem: Patient Centered Care  Goal: Patient preferences are identified and integrated in the patient's plan of care  Description: Interventions:  - What would you like us to know as we care for you?   - Provide timely, complete, and accurate information to patient/family  - Incorporate patient and family knowledge, values, beliefs, and cultural backgrounds into the planning and delivery of care  - Encourage patient/family to participate in care and decision-making at the level they choose  - Honor patient and family perspectives and choices  Outcome: Adequate for Discharge

## 2023-09-25 ENCOUNTER — PATIENT OUTREACH (OUTPATIENT)
Dept: CASE MANAGEMENT | Age: 28
End: 2023-09-25

## 2023-09-25 ENCOUNTER — TELEPHONE (OUTPATIENT)
Dept: INTERNAL MEDICINE CLINIC | Facility: CLINIC | Age: 28
End: 2023-09-25

## 2023-09-25 VITALS — WEIGHT: 222 LBS | BODY MASS INDEX: 32.88 KG/M2 | HEIGHT: 69 IN

## 2023-09-25 DIAGNOSIS — T81.43XA POSTPROCEDURAL INTRAABDOMINAL ABSCESS: ICD-10-CM

## 2023-09-25 DIAGNOSIS — K35.32 PERFORATED APPENDICITIS: Primary | ICD-10-CM

## 2023-09-25 DIAGNOSIS — Z02.9 ENCOUNTERS FOR UNSPECIFIED ADMINISTRATIVE PURPOSE: Primary | ICD-10-CM

## 2023-09-25 PROCEDURE — 1111F DSCHRG MED/CURRENT MED MERGE: CPT

## 2023-09-25 NOTE — TELEPHONE ENCOUNTER
Noted , patient  should  schedule appointment for, evaluation and treatment after the hospitalization for other things as well her heart lungs etc...      Thanks
Sent as FYI/TCM protocol:    Spoke to pt for TCM today. Pt confirms has f/u IR drain appt 9/27/2023. Pt also will call for ID f/u appt with Dr. Myla Her and has f/u appt with gen blanchard, Dr. Fabien Luna. Offered TCM appt with Dr. Antoni Romero or GEETHA Batista--pt declines--prefers to f/u with specialists, only, at this time. \"Once everything settles down, I will make an appointment with her. \"    Per guidelines patient should  be seen within seven days by 9/30/2023. Otherwise, last date for TCM: 10/07/2023       Follow up appointments: Follow up With Specialties Details Why Contact Info   Katrin Spencer MD Internal Medicine Schedule an appointment as soon as possible for a visit in 7 day(s)  [address] [facility]  [phone]   [facility], [address], [address]  Call they will reach out to you on monday for follow up appointment [facility]  [facility] [id number] [phone]   Christina Brown MD INFECTIOUS DISEASES Schedule an appointment as soon as possible for a visit in 2 week(s)  [facility]  [address] [facility]  [phone]     Your appointments       Date & Time Appointment Department [facility])    Sep 27, 2023  8:00 AM CDT INTERVENTIONAL SUITES - INTERVENTIONAL RADIOLOGY 60 with [address] ([address])
4 = No assist / stand by assistance

## 2023-09-27 ENCOUNTER — HOSPITAL ENCOUNTER (OUTPATIENT)
Dept: INTERVENTIONAL RADIOLOGY/VASCULAR | Facility: HOSPITAL | Age: 28
Discharge: HOME | End: 2023-09-27
Attending: NURSE PRACTITIONER
Payer: COMMERCIAL

## 2023-10-04 ENCOUNTER — HOSPITAL ENCOUNTER (OUTPATIENT)
Dept: INTERVENTIONAL RADIOLOGY/VASCULAR | Facility: HOSPITAL | Age: 28
Discharge: HOME | End: 2023-10-04
Attending: NURSE PRACTITIONER | Admitting: STUDENT IN AN ORGANIZED HEALTH CARE EDUCATION/TRAINING PROGRAM
Payer: COMMERCIAL

## 2023-10-04 VITALS — WEIGHT: 222 LBS | BODY MASS INDEX: 32.88 KG/M2 | HEIGHT: 69 IN | TEMPERATURE: 97 F

## 2023-10-04 DIAGNOSIS — K35.32 PERFORATED APPENDICITIS: ICD-10-CM

## 2023-10-04 PROCEDURE — 76080 X-RAY EXAM OF FISTULA: CPT

## 2023-10-04 PROCEDURE — BW111ZZ FLUOROSCOPY OF ABDOMEN AND PELVIS USING LOW OSMOLAR CONTRAST: ICD-10-PCS | Performed by: STUDENT IN AN ORGANIZED HEALTH CARE EDUCATION/TRAINING PROGRAM

## 2023-10-04 PROCEDURE — 49424 ASSESS CYST CONTRAST INJECT: CPT

## 2023-10-04 NOTE — IVS NOTE
DISCHARGE NOTE     Pt is able to sit up and ambulate without difficulty. Procedural site remains dry and intact with good circulation, motion and sensation. No signs or symptoms of bleeding/hematoma noted. Pt denies any pain or discomfort at this time. Instruction provided, patient verbalizes understanding. Dr. Ankush Morillo spoke with patient/ post procedure.      Pt discharge  to Main Lobby     Follow up Appointment: n/a    New Prescription: n/a

## 2023-10-04 NOTE — INTERVAL H&P NOTE
The above referenced H&P was reviewed by Skyler Varela MD on 10/4/2023, the patient was examined and no significant changes have occurred in the patient's condition since the H&P was performed. Risks, benefits, alternative treatments and consequences of no treatment were discussed. We will proceed with procedure as planned.       Skyler Varela MD  10/4/2023  10:49 AM

## 2023-10-04 NOTE — DISCHARGE INSTRUCTIONS
Pr-14  4.2  [phone]     Patient Name:  Gina Kuhn    Procedure:  Drain Removal    Site Care: Remove your band-aid or dressing in 24 hours. Gently wash area with soap and water. Activity/Diet  No heavy lifting or strenuous activity for 48 hours. Drink plenty of fluids, unless you have otherwise been told to restrict your fluid intake. Do not drink alcohol for 24 hours. Do not drive,  operate heavy machinery, make important decisions or sign legal documents today. Medications:  Make no changes to your existing medications. Contact Interventional Radiology at [phone] if you have severe/unrelieved pain, fever, chills, dizziness/lightheadedness, or drainage/bleeding from your incision site.

## 2023-10-04 NOTE — PROCEDURES
Interventional Radiology  Brief Post-Procedure Note    Procedure(s): drain check and removal    Pre-procedure diagnosis: Perforated appendicitis [K35.32]    Post-procedure diagnosis: Perforated appendicitis [K35.32]    Indication: fluid collection after appendicitis    Anesthesia:  None    Findings:    -drain check demonstrating resolved collection  -drain removed    Blood loss: <1 mL      Complications: None    Plan:   -continue antibiotics course    Please refer to full dictation under the \"Imaging\" tab in Epic.     Edilma Antony MD  10/4/2023  Interventional Radiology  [facility]

## 2023-10-10 ENCOUNTER — OFFICE VISIT (OUTPATIENT)
Dept: SURGERY | Facility: CLINIC | Age: 28
End: 2023-10-10
Payer: COMMERCIAL

## 2023-10-10 VITALS
DIASTOLIC BLOOD PRESSURE: 87 MMHG | BODY MASS INDEX: 32.53 KG/M2 | HEIGHT: 69 IN | OXYGEN SATURATION: 96 % | WEIGHT: 219.63 LBS | RESPIRATION RATE: 16 BRPM | HEART RATE: 92 BPM | SYSTOLIC BLOOD PRESSURE: 123 MMHG

## 2023-10-10 DIAGNOSIS — K35.32 PERFORATED APPENDICITIS: Primary | ICD-10-CM

## 2023-10-10 PROCEDURE — 99024 POSTOP FOLLOW-UP VISIT: CPT | Performed by: SURGERY

## 2023-10-10 PROCEDURE — 3079F DIAST BP 80-89 MM HG: CPT | Performed by: SURGERY

## 2023-10-10 PROCEDURE — 3008F BODY MASS INDEX DOCD: CPT | Performed by: SURGERY

## 2023-10-10 PROCEDURE — 3074F SYST BP LT 130 MM HG: CPT | Performed by: SURGERY

## 2023-10-16 NOTE — PROGRESS NOTES
Patient presents for follow-up  Drain is out, she feels well  No complaints      Pk Walters MD  Complex General Surgical Oncology  [facility] 112  Pager [phone]  [email or web address]

## 2024-03-07 ENCOUNTER — NURSE TRIAGE (OUTPATIENT)
Dept: INTERNAL MEDICINE CLINIC | Facility: CLINIC | Age: 29
End: 2024-03-07

## 2024-03-07 ENCOUNTER — APPOINTMENT (OUTPATIENT)
Dept: CT IMAGING | Facility: HOSPITAL | Age: 29
End: 2024-03-07
Attending: STUDENT IN AN ORGANIZED HEALTH CARE EDUCATION/TRAINING PROGRAM
Payer: COMMERCIAL

## 2024-03-07 ENCOUNTER — HOSPITAL ENCOUNTER (EMERGENCY)
Facility: HOSPITAL | Age: 29
Discharge: HOME | End: 2024-03-07
Attending: STUDENT IN AN ORGANIZED HEALTH CARE EDUCATION/TRAINING PROGRAM
Payer: COMMERCIAL

## 2024-03-07 VITALS
OXYGEN SATURATION: 96 % | HEART RATE: 97 BPM | BODY MASS INDEX: 32.58 KG/M2 | HEIGHT: 69 IN | DIASTOLIC BLOOD PRESSURE: 79 MMHG | WEIGHT: 220 LBS | TEMPERATURE: 98 F | SYSTOLIC BLOOD PRESSURE: 120 MMHG | RESPIRATION RATE: 18 BRPM

## 2024-03-07 DIAGNOSIS — R10.9 ABDOMINAL PAIN OF UNKNOWN ETIOLOGY: Primary | ICD-10-CM

## 2024-03-07 LAB
ALBUMIN SERPL-MCNC: 4.7 G/DL (ref 3.2–4.8)
ALBUMIN/GLOB SERPL: 1.5 {RATIO} (ref 1–2)
ALP LIVER SERPL-CCNC: 76 U/L
ALT SERPL-CCNC: 35 U/L
ANION GAP SERPL CALC-SCNC: 5 MMOL/L (ref 0–18)
AST SERPL-CCNC: 24 U/L (ref ?–34)
B-HCG UR QL: NEGATIVE
BASOPHILS # BLD AUTO: 0.03 X10(3) UL (ref 0–0.2)
BASOPHILS NFR BLD AUTO: 0.3 %
BILIRUB SERPL-MCNC: 0.3 MG/DL (ref 0.3–1.2)
BILIRUB UR QL: NEGATIVE
BUN BLD-MCNC: 12 MG/DL (ref 9–23)
BUN/CREAT SERPL: 12.8 (ref 10–20)
CALCIUM BLD-MCNC: 10 MG/DL (ref 8.7–10.4)
CHLORIDE SERPL-SCNC: 106 MMOL/L (ref 98–112)
CLARITY UR: CLEAR
CO2 SERPL-SCNC: 25 MMOL/L (ref 21–32)
COLOR UR: COLORLESS
CREAT BLD-MCNC: 0.94 MG/DL
DEPRECATED RDW RBC AUTO: 43.6 FL (ref 35.1–46.3)
EGFRCR SERPLBLD CKD-EPI 2021: 86 ML/MIN/1.73M2 (ref 60–?)
EOSINOPHIL # BLD AUTO: 0.15 X10(3) UL (ref 0–0.7)
EOSINOPHIL NFR BLD AUTO: 1.3 %
ERYTHROCYTE [DISTWIDTH] IN BLOOD BY AUTOMATED COUNT: 14.3 % (ref 11–15)
GLOBULIN PLAS-MCNC: 3.2 G/DL (ref 2.8–4.4)
GLUCOSE BLD-MCNC: 88 MG/DL (ref 70–99)
GLUCOSE UR-MCNC: NORMAL MG/DL
HCT VFR BLD AUTO: 42.7 %
HGB BLD-MCNC: 14.2 G/DL
HGB UR QL STRIP.AUTO: NEGATIVE
IMM GRANULOCYTES # BLD AUTO: 0.04 X10(3) UL (ref 0–1)
IMM GRANULOCYTES NFR BLD: 0.4 %
KETONES UR-MCNC: NEGATIVE MG/DL
LEUKOCYTE ESTERASE UR QL STRIP.AUTO: NEGATIVE
LIPASE SERPL-CCNC: 37 U/L (ref 13–75)
LYMPHOCYTES # BLD AUTO: 3.62 X10(3) UL (ref 1–4)
LYMPHOCYTES NFR BLD AUTO: 31.9 %
MCH RBC QN AUTO: 28.1 PG (ref 26–34)
MCHC RBC AUTO-ENTMCNC: 33.3 G/DL (ref 31–37)
MCV RBC AUTO: 84.6 FL
MONOCYTES # BLD AUTO: 0.56 X10(3) UL (ref 0.1–1)
MONOCYTES NFR BLD AUTO: 4.9 %
NEUTROPHILS # BLD AUTO: 6.96 X10 (3) UL (ref 1.5–7.7)
NEUTROPHILS # BLD AUTO: 6.96 X10(3) UL (ref 1.5–7.7)
NEUTROPHILS NFR BLD AUTO: 61.2 %
NITRITE UR QL STRIP.AUTO: NEGATIVE
OSMOLALITY SERPL CALC.SUM OF ELEC: 281 MOSM/KG (ref 275–295)
PH UR: 5 [PH] (ref 5–8)
PLATELET # BLD AUTO: 314 10(3)UL (ref 150–450)
POTASSIUM SERPL-SCNC: 3.7 MMOL/L (ref 3.5–5.1)
PROT SERPL-MCNC: 7.9 G/DL (ref 5.7–8.2)
PROT UR-MCNC: NEGATIVE MG/DL
RBC # BLD AUTO: 5.05 X10(6)UL
SODIUM SERPL-SCNC: 136 MMOL/L (ref 136–145)
SP GR UR STRIP: 1.01 (ref 1–1.03)
UROBILINOGEN UR STRIP-ACNC: NORMAL
WBC # BLD AUTO: 11.4 X10(3) UL (ref 4–11)

## 2024-03-07 PROCEDURE — 83690 ASSAY OF LIPASE: CPT | Performed by: STUDENT IN AN ORGANIZED HEALTH CARE EDUCATION/TRAINING PROGRAM

## 2024-03-07 PROCEDURE — 99285 EMERGENCY DEPT VISIT HI MDM: CPT

## 2024-03-07 PROCEDURE — 99284 EMERGENCY DEPT VISIT MOD MDM: CPT

## 2024-03-07 PROCEDURE — 81003 URINALYSIS AUTO W/O SCOPE: CPT | Performed by: STUDENT IN AN ORGANIZED HEALTH CARE EDUCATION/TRAINING PROGRAM

## 2024-03-07 PROCEDURE — 80053 COMPREHEN METABOLIC PANEL: CPT | Performed by: STUDENT IN AN ORGANIZED HEALTH CARE EDUCATION/TRAINING PROGRAM

## 2024-03-07 PROCEDURE — 83690 ASSAY OF LIPASE: CPT

## 2024-03-07 PROCEDURE — 80053 COMPREHEN METABOLIC PANEL: CPT

## 2024-03-07 PROCEDURE — 85025 COMPLETE CBC W/AUTO DIFF WBC: CPT

## 2024-03-07 PROCEDURE — 96374 THER/PROPH/DIAG INJ IV PUSH: CPT

## 2024-03-07 PROCEDURE — 85025 COMPLETE CBC W/AUTO DIFF WBC: CPT | Performed by: STUDENT IN AN ORGANIZED HEALTH CARE EDUCATION/TRAINING PROGRAM

## 2024-03-07 PROCEDURE — 81025 URINE PREGNANCY TEST: CPT

## 2024-03-07 PROCEDURE — 74177 CT ABD & PELVIS W/CONTRAST: CPT | Performed by: STUDENT IN AN ORGANIZED HEALTH CARE EDUCATION/TRAINING PROGRAM

## 2024-03-07 RX ORDER — ONDANSETRON 2 MG/ML
INJECTION INTRAMUSCULAR; INTRAVENOUS
Status: COMPLETED
Start: 2024-03-07 | End: 2024-03-07

## 2024-03-07 RX ORDER — ONDANSETRON 4 MG/1
4 TABLET, ORALLY DISINTEGRATING ORAL EVERY 4 HOURS PRN
Qty: 10 TABLET | Refills: 0 | Status: SHIPPED | OUTPATIENT
Start: 2024-03-07 | End: 2024-03-14

## 2024-03-07 RX ORDER — ONDANSETRON 2 MG/ML
4 INJECTION INTRAMUSCULAR; INTRAVENOUS ONCE
Status: COMPLETED | OUTPATIENT
Start: 2024-03-07 | End: 2024-03-07

## 2024-03-07 NOTE — TELEPHONE ENCOUNTER
Please reply to pool: EM RN TRIAGE  Action Requested: Summary for Provider     []  Critical Lab, Recommendations Needed  [] Need Additional Advice  [x]   FYI    []   Need Orders  [] Need Medications Sent to Pharmacy  []  Other     SUMMARY: Patient contacts clinic reporting RLQ pain and abdominal swelling over the last few days.  History of appendectomy and post operative abscess.  Currently denies fever or diarrhea.  She did vomit once this morning food and fluid.  Pants are fitting tighter.  Pain steadily increasing.  Worse with certain movements and palpation.  Denies rebound tenderness.  Given the patient's history, ER recommended for possible CT or other imaging.  She agreed and will go.  Postponed for follow up.     Reason for call: Abdominal Pain  Onset: Data Unavailable                       Reason for Disposition   Vomiting and abdomen looks much more swollen than usual    Protocols used: Abdominal Pain - Female-A-OH

## 2024-03-07 NOTE — ED INITIAL ASSESSMENT (HPI)
Patient arrives ambulatory through triage with complaints of right sided abdominal pain since this AM.   +N/V  Sent to ER by primary care.

## 2024-03-08 ENCOUNTER — PATIENT OUTREACH (OUTPATIENT)
Dept: CASE MANAGEMENT | Age: 29
End: 2024-03-08

## 2024-03-08 NOTE — DISCHARGE INSTRUCTIONS
Return to the emergency department if you develop severe abdominal pain, severe nausea and  vomiting to the point where you are unable to keep down fluids, if you develop chest pain or  difficulty breathing, blood in your stool, dizziness or fainting, or if you develop any other new or  concerning symptoms as these could be signs of more serious medical illness. Try to stay well  hydrated.

## 2024-03-08 NOTE — PROGRESS NOTES
VM received; Patient returning call & requesting assistance w/scheduling her appt sooner than 4/15 with her PCP.

## 2024-03-08 NOTE — PROGRESS NOTES
1st attempt ER f/up apt request  No answer, LVMTCB to schedule apt  PCP -existing apt (4/15), unable to contact  Closing encounter

## 2024-03-08 NOTE — PROGRESS NOTES
2nd attempt ER f/up apt request  PCP -existing apt (4/15), pt doesn't want sooner apt  Closing encounter

## 2024-03-19 ENCOUNTER — OFFICE VISIT (OUTPATIENT)
Dept: FAMILY MEDICINE CLINIC | Facility: CLINIC | Age: 29
End: 2024-03-19
Payer: COMMERCIAL

## 2024-03-19 VITALS
WEIGHT: 242 LBS | DIASTOLIC BLOOD PRESSURE: 81 MMHG | BODY MASS INDEX: 35.84 KG/M2 | RESPIRATION RATE: 16 BRPM | SYSTOLIC BLOOD PRESSURE: 122 MMHG | HEART RATE: 97 BPM | HEIGHT: 69 IN

## 2024-03-19 DIAGNOSIS — K76.0 HEPATIC STEATOSIS: ICD-10-CM

## 2024-03-19 DIAGNOSIS — R93.1 ABNORMAL CT SCAN OF HEART: ICD-10-CM

## 2024-03-19 DIAGNOSIS — N83.201 CYSTS OF BOTH OVARIES: Primary | ICD-10-CM

## 2024-03-19 DIAGNOSIS — N83.202 CYSTS OF BOTH OVARIES: Primary | ICD-10-CM

## 2024-03-19 PROCEDURE — 99204 OFFICE O/P NEW MOD 45 MIN: CPT | Performed by: FAMILY MEDICINE

## 2024-03-19 PROCEDURE — 3074F SYST BP LT 130 MM HG: CPT | Performed by: FAMILY MEDICINE

## 2024-03-19 PROCEDURE — 3079F DIAST BP 80-89 MM HG: CPT | Performed by: FAMILY MEDICINE

## 2024-03-19 PROCEDURE — 3008F BODY MASS INDEX DOCD: CPT | Performed by: FAMILY MEDICINE

## 2024-03-19 NOTE — PROGRESS NOTES
Gloria Tariq is a 28 year old female.  HPI:   Patient is here to follow-up after recent ER evaluation, patient had ruptured appendix last year, she then developed an abscess and had been overall doing well until recently when she developed after her manses severe pelvic pain, she was concerned as symptoms were similar to when she developed the abscess, was seen in ER and completed CT that did not show any new gastrointestinal findings, there was evidence of ovarian cyst, fatty liver as well as possible septal lipoma  Current Outpatient Medications   Medication Sig Dispense Refill    acetaminophen 325 MG Oral Tab Take 2 tablets (650 mg total) by mouth every 6 (six) hours as needed for Pain. Patient taking OTC med for pain instead of norco        Past Medical History:   Diagnosis Date    Acne     Arthritis     Pseudomonas infection     per NG; Hx of pseudomonas nail inf - F/U with Dr Edwards    Visual impairment       Social History:  Social History     Socioeconomic History    Marital status: Single   Tobacco Use    Smoking status: Never    Smokeless tobacco: Never   Substance and Sexual Activity    Alcohol use: No    Drug use: Not Currently   Other Topics Concern    Caffeine Concern No    Pt has a pacemaker No    Pt has a defibrillator No    Reaction to local anesthetic No     Social Determinants of Health     Financial Resource Strain: Low Risk  (9/25/2023)    Financial Resource Strain     Difficulty of Paying Living Expenses: Not very hard     Med Affordability: No   Transportation Needs: No Transportation Needs (9/25/2023)    Transportation Needs     Lack of Transportation: No          EXAM:   /81   Pulse 97   Resp 16   Ht 5' 9\" (1.753 m)   Wt 242 lb (109.8 kg)   LMP 02/24/2024 (Approximate)   BMI 35.74 kg/m²     Physical Exam  Vitals and nursing note reviewed.   Constitutional:       Appearance: Normal appearance.   HENT:      Head: Normocephalic and atraumatic.   Cardiovascular:      Rate and  Rhythm: Normal rate and regular rhythm.      Pulses: Normal pulses.      Heart sounds: Normal heart sounds, S1 normal and S2 normal.   Pulmonary:      Effort: Pulmonary effort is normal. No respiratory distress.      Breath sounds: Normal breath sounds.   Abdominal:      Tenderness: There is no abdominal tenderness. There is no guarding or rebound.   Skin:     General: Skin is warm and dry.   Neurological:      General: No focal deficit present.      Mental Status: She is alert and oriented to person, place, and time. Mental status is at baseline.   Psychiatric:         Mood and Affect: Mood normal.         Behavior: Behavior normal.            ASSESSMENT AND PLAN:   1. Cysts of both ovaries  Only significant finding in CT regarding pain was ovarian cyst, no prior noted cyst in the extensive prior imaging the patient had, as noted imaging was completed just after manses, will complete follow-up ultrasound imaging  - US PELVIS (TRANSABDOMINAL AND TRANSVAGINAL) (CPT=76856/15227); Future    2. Hepatic steatosis  Diagnosis discussed with patient, she is working lifestyle changes    3. Abnormal CT scan of heart  Incidental finding of possible septal lipoma, patient is asymptomatic at this time, diagnosis discussed with patient, as she is asymptomatic does not need further intervention       The patient indicates understanding of these issues and agrees to the plan.      The above note was creating using Dragon speech recognition technology. Please excuse any typos.

## 2024-04-10 ENCOUNTER — HOSPITAL ENCOUNTER (OUTPATIENT)
Dept: ULTRASOUND IMAGING | Age: 29
Discharge: HOME | End: 2024-04-10
Attending: FAMILY MEDICINE
Payer: COMMERCIAL

## 2024-04-10 DIAGNOSIS — N83.202 CYSTS OF BOTH OVARIES: ICD-10-CM

## 2024-04-10 DIAGNOSIS — N83.201 CYSTS OF BOTH OVARIES: ICD-10-CM

## 2024-04-10 PROCEDURE — 76856 US EXAM PELVIC COMPLETE: CPT | Performed by: FAMILY MEDICINE

## 2024-08-01 ENCOUNTER — EKG ENCOUNTER (OUTPATIENT)
Dept: LAB | Age: 29
End: 2024-08-01
Attending: INTERNAL MEDICINE
Payer: COMMERCIAL

## 2024-08-01 ENCOUNTER — OFFICE VISIT (OUTPATIENT)
Dept: INTERNAL MEDICINE CLINIC | Facility: CLINIC | Age: 29
End: 2024-08-01
Payer: COMMERCIAL

## 2024-08-01 VITALS
BODY MASS INDEX: 34.8 KG/M2 | DIASTOLIC BLOOD PRESSURE: 80 MMHG | HEIGHT: 69 IN | HEART RATE: 98 BPM | SYSTOLIC BLOOD PRESSURE: 112 MMHG | WEIGHT: 235 LBS

## 2024-08-01 DIAGNOSIS — D17.79 LIPOMA OF OTHER SPECIFIED SITES: Primary | ICD-10-CM

## 2024-08-01 DIAGNOSIS — R55 PRE-SYNCOPE: ICD-10-CM

## 2024-08-01 LAB
ATRIAL RATE: 74 BPM
P AXIS: 50 DEGREES
P-R INTERVAL: 170 MS
Q-T INTERVAL: 392 MS
QRS DURATION: 74 MS
QTC CALCULATION (BEZET): 435 MS
R AXIS: 39 DEGREES
T AXIS: 46 DEGREES
VENTRICULAR RATE: 74 BPM

## 2024-08-01 PROCEDURE — 93010 ELECTROCARDIOGRAM REPORT: CPT | Performed by: STUDENT IN AN ORGANIZED HEALTH CARE EDUCATION/TRAINING PROGRAM

## 2024-08-01 PROCEDURE — 93005 ELECTROCARDIOGRAM TRACING: CPT

## 2024-08-01 PROCEDURE — 3079F DIAST BP 80-89 MM HG: CPT | Performed by: INTERNAL MEDICINE

## 2024-08-01 PROCEDURE — 3008F BODY MASS INDEX DOCD: CPT | Performed by: INTERNAL MEDICINE

## 2024-08-01 PROCEDURE — 99213 OFFICE O/P EST LOW 20 MIN: CPT | Performed by: INTERNAL MEDICINE

## 2024-08-01 PROCEDURE — 3074F SYST BP LT 130 MM HG: CPT | Performed by: INTERNAL MEDICINE

## 2024-08-01 NOTE — PROGRESS NOTES
Gloria Tariq is a 29 year old female.  Chief Complaint   Patient presents with    Establish Care     HPI:    Patient presented today for establishment of care. She states that she was diagnosed with a possible cardiac lipoma on a CT scan few months ago. She was doing well so far but now has noticed episodes of chest tightness and palpitations on two occaisions. Almost felt like passing out. It got better with resting.    Asymptomatic today.   Use to have anemia. Not on any iron now. And last cbc was normal   Periods are not heavy anymore.     No current outpatient medications on file.      Past Medical History:    Acne    Arthritis    Pseudomonas infection    per NG; Hx of pseudomonas nail inf - F/U with Dr Edwards    Visual impairment      Past Surgical History:   Procedure Laterality Date    Appendectomy  09/18/2023    Arthroscopy of joint unlisted      Knee surgery Left     cartilage self-transplant      Social History:  Social History     Socioeconomic History    Marital status: Single   Tobacco Use    Smoking status: Never    Smokeless tobacco: Never   Vaping Use    Vaping status: Never Used   Substance and Sexual Activity    Alcohol use: No    Drug use: Not Currently   Other Topics Concern    Caffeine Concern No    Pt has a pacemaker No    Pt has a defibrillator No    Reaction to local anesthetic No     Social Determinants of Health     Financial Resource Strain: Low Risk  (9/25/2023)    Financial Resource Strain     Difficulty of Paying Living Expenses: Not very hard     Med Affordability: No   Transportation Needs: No Transportation Needs (9/25/2023)    Transportation Needs     Lack of Transportation: No      Family History   Problem Relation Age of Onset    Cancer Mother         non hodgkins lymphoma    Asthma Mother     Arthritis Mother         Rheumatoid Arthritis    Fibromyalgia Mother     Arthritis Maternal Grandmother         Rheumatoid Arthritis    Diabetes Maternal Grandfather     Diabetes  Paternal Grandfather     Ear Problems Brother         Hearing Loss    Asthma Brother     Arthritis Maternal Uncle         Rheumatoid Arthritis      No Known Allergies     REVIEW OF SYSTEMS:   Review of Systems   Review of Systems   Constitutional: Negative for activity change, appetite change and fever.   HENT: Negative for congestion and voice change.    Respiratory: Negative for cough and shortness of breath.    Cardiovascular: Negative for chest pain.   Gastrointestinal: Negative for abdominal distention, abdominal pain and vomiting.   Genitourinary: Negative for hematuria.   Skin: Negative for wound.   Psychiatric/Behavioral: Negative for behavioral problems.   Wt Readings from Last 5 Encounters:   08/01/24 235 lb (106.6 kg)   03/19/24 242 lb (109.8 kg)   03/07/24 220 lb (99.8 kg)   10/10/23 219 lb 9.6 oz (99.6 kg)   09/27/23 222 lb (100.7 kg)     Body mass index is 34.7 kg/m².      EXAM:   /80 (BP Location: Right arm, Patient Position: Sitting, Cuff Size: large)   Pulse 98   Ht 5' 9\" (1.753 m)   Wt 235 lb (106.6 kg)   LMP 07/27/2024 (Approximate)   BMI 34.70 kg/m²   Physical Exam   Constitutional:       Appearance: Normal appearance.   HENT:      Head: Normocephalic.   Eyes:      Conjunctiva/sclera: Conjunctivae normal.   Cardiovascular:      Rate and Rhythm: Normal rate and regular rhythm.      Heart sounds: Normal heart sounds. No murmur heard.  Pulmonary:      Effort: Pulmonary effort is normal.      Breath sounds: Normal breath sounds. No rhonchi or rales.   Abdominal:      General: Bowel sounds are normal.      Palpations: Abdomen is soft.      Tenderness: There is no abdominal tenderness.   Musculoskeletal:      Cervical back: Neck supple.      Right lower leg: No edema.      Left lower leg: No edema.   Skin:     General: Skin is warm and dry.   Neurological:      General: No focal deficit present.      Mental Status: He is alert and oriented to person, place, and time. Mental status is at  baseline.   Psychiatric:         Mood and Affect: Mood normal.         Behavior: Behavior normal.       ASSESSMENT AND PLAN:   1. Lipoma of other specified sites  2. Pre-syncope  - will do work up to r/o structural heart abnormality  - Patient also to be seen by cardiology for further eval of this cardiac lipoma  - advised her to go to ER if symptoms return  - CARD ECHO 2D DOPPLER (CPT=93306); Future  - EKG 12 Lead    The patient indicates understanding of these issues and agrees to the plan.  Return for annual physical in 1 month     Flor Duncan MD

## 2024-08-02 ENCOUNTER — TELEPHONE (OUTPATIENT)
Dept: INTERNAL MEDICINE CLINIC | Facility: CLINIC | Age: 29
End: 2024-08-02

## 2024-08-02 DIAGNOSIS — R00.2 PALPITATIONS: ICD-10-CM

## 2024-08-02 DIAGNOSIS — D17.79 LIPOMA OF OTHER SPECIFIED SITES: Primary | ICD-10-CM

## 2024-08-02 NOTE — TELEPHONE ENCOUNTER
Per patient, they told her that Dr Arvizu will going to retire soon and that she needs to look for another Cardio doctor,  Please advise.

## 2024-08-08 ENCOUNTER — HOSPITAL ENCOUNTER (OUTPATIENT)
Dept: CV DIAGNOSTICS | Age: 29
Discharge: HOME | End: 2024-08-08
Attending: INTERNAL MEDICINE
Payer: COMMERCIAL

## 2024-08-08 DIAGNOSIS — D17.79 LIPOMA OF OTHER SPECIFIED SITES: ICD-10-CM

## 2024-08-08 PROCEDURE — 93306 TTE W/DOPPLER COMPLETE: CPT | Performed by: INTERNAL MEDICINE

## 2024-09-13 ENCOUNTER — ORDER TRANSCRIPTION (OUTPATIENT)
Dept: ADMINISTRATIVE | Facility: HOSPITAL | Age: 29
End: 2024-09-13

## 2024-09-13 DIAGNOSIS — R42 DIZZINESS: Primary | ICD-10-CM

## 2024-09-13 DIAGNOSIS — E78.2 MIXED HYPERLIPIDEMIA: ICD-10-CM

## 2024-09-13 DIAGNOSIS — R00.2 PALPITATIONS: ICD-10-CM

## 2024-10-25 ENCOUNTER — HOSPITAL ENCOUNTER (OUTPATIENT)
Dept: CT IMAGING | Facility: HOSPITAL | Age: 29
Discharge: HOME | End: 2024-10-25
Attending: STUDENT IN AN ORGANIZED HEALTH CARE EDUCATION/TRAINING PROGRAM
Payer: COMMERCIAL

## 2024-10-25 VITALS
BODY MASS INDEX: 34.8 KG/M2 | WEIGHT: 235 LBS | HEART RATE: 72 BPM | RESPIRATION RATE: 12 BRPM | HEIGHT: 69 IN | SYSTOLIC BLOOD PRESSURE: 98 MMHG | DIASTOLIC BLOOD PRESSURE: 72 MMHG

## 2024-10-25 DIAGNOSIS — R42 DIZZINESS: ICD-10-CM

## 2024-10-25 DIAGNOSIS — E78.2 MIXED HYPERLIPIDEMIA: ICD-10-CM

## 2024-10-25 DIAGNOSIS — R00.2 PALPITATIONS: ICD-10-CM

## 2024-10-25 LAB
CREAT BLD-MCNC: 0.8 MG/DL
EGFRCR SERPLBLD CKD-EPI 2021: 104 ML/MIN/1.73M2 (ref 60–?)

## 2024-10-25 PROCEDURE — 75574 CT ANGIO HRT W/3D IMAGE: CPT | Performed by: STUDENT IN AN ORGANIZED HEALTH CARE EDUCATION/TRAINING PROGRAM

## 2024-10-25 PROCEDURE — 82565 ASSAY OF CREATININE: CPT

## 2024-10-25 RX ORDER — METOPROLOL TARTRATE 25 MG/1
TABLET, FILM COATED ORAL
Status: DISCONTINUED
Start: 2024-10-25 | End: 2024-10-25

## 2024-10-25 RX ORDER — METOPROLOL TARTRATE 1 MG/ML
5 INJECTION, SOLUTION INTRAVENOUS SEE ADMIN INSTRUCTIONS
Status: DISCONTINUED | OUTPATIENT
Start: 2024-10-25 | End: 2024-10-27

## 2024-10-25 RX ORDER — METOPROLOL TARTRATE 25 MG/1
TABLET, FILM COATED ORAL
Status: DISPENSED
Start: 2024-10-25 | End: 2024-10-25

## 2024-10-25 RX ORDER — METOPROLOL TARTRATE 1 MG/ML
INJECTION, SOLUTION INTRAVENOUS
Status: DISPENSED
Start: 2024-10-25 | End: 2024-10-25

## 2024-10-25 RX ORDER — METOPROLOL TARTRATE 25 MG/1
50 TABLET, FILM COATED ORAL ONCE AS NEEDED
Status: COMPLETED | OUTPATIENT
Start: 2024-10-25 | End: 2024-10-25

## 2024-10-25 RX ORDER — NITROGLYCERIN 0.4 MG/1
0.4 TABLET SUBLINGUAL ONCE
Status: COMPLETED | OUTPATIENT
Start: 2024-10-25 | End: 2024-10-25

## 2024-10-25 RX ORDER — METOPROLOL TARTRATE 25 MG/1
100 TABLET, FILM COATED ORAL ONCE AS NEEDED
Status: COMPLETED | OUTPATIENT
Start: 2024-10-25 | End: 2024-10-25

## 2024-10-25 RX ORDER — DILTIAZEM HYDROCHLORIDE 5 MG/ML
5 INJECTION INTRAVENOUS SEE ADMIN INSTRUCTIONS
Status: DISCONTINUED | OUTPATIENT
Start: 2024-10-25 | End: 2024-10-27

## 2024-10-25 RX ADMIN — METOPROLOL TARTRATE 100 MG: 25 TABLET, FILM COATED ORAL at 08:13:00

## 2024-10-25 RX ADMIN — METOPROLOL TARTRATE 5 MG: 1 INJECTION, SOLUTION INTRAVENOUS at 10:31:00

## 2024-10-25 RX ADMIN — METOPROLOL TARTRATE 50 MG: 25 TABLET, FILM COATED ORAL at 09:08:00

## 2024-10-25 RX ADMIN — NITROGLYCERIN 0.4 MG: 0.4 TABLET SUBLINGUAL at 10:26:00

## 2024-10-25 RX ADMIN — METOPROLOL TARTRATE 5 MG: 1 INJECTION, SOLUTION INTRAVENOUS at 10:27:00

## 2024-10-25 NOTE — DISCHARGE INSTRUCTIONS
Computed Tomography Angiography (CTA)  Computed tomography angiography (CTA) is an imaging test. It uses X-rays and computer technology to make detailed pictures of your arteries (blood vessels). Before the test, an X-ray dye (contrast medium) is injected into your vein. The dye makes it easier to see your blood vessels on the X-ray. Pictures are then taken with the CT scanner. A computer turns the images into 2-D and 3-D pictures.      CTA can make 3D images, such as the carotid arteries shown here.     Why CTA is done  CTA may be used to:  Check arteries in your belly, neck, lungs, pelvis, kidneys, or brain.  Look for a ballooning of the blood vessel wall (aneurysm) or a tear (dissection).  Check if a tube (stent) used to keep an artery open is working well.  Find damage to your arteries due to injuries.  Collect details on blood vessels that supply blood to tumors.  Getting ready for your test  Tell your healthcare provider if you:   Have diabetes  Have kidney disease  Are allergic to X-ray dye or other medicines  Are pregnant, think you may be pregnant, or are breastfeeding  Are taking any medicines, herbs, or supplements, including prescription medicines, illegal drugs, and over-the-counter medicines such as aspirin or ibuprofen  Follow any directions you are given for not eating or drinking before the CTA. Follow any other instructions from your healthcare provider.   During your test  You will be asked to remove any hair clips, jewelry, false teeth, or other metal items that could show up on the X-ray.  You will lie down on the scanning table. An IV line will be put in a vein in your arm or hand.  The scanning table will be properly placed. The part of your body being checked will be inside the doughnut-shaped CT scanner.  One image may be taken first to be sure you are in the proper position for the test.  The IV will be hooked up to an automatic injection machine. This controls how often and how fast the  X-ray dye is injected. The injection may continue during part of the exam.  The dye will be put into your vein through the IV line. You may feel warmth through your body when the dye is injected.  You can’t move while the X-rays are being taken. Pillows and foam pads may be used to help you stay still. You will be told to hold your breath for 10 to 25 seconds at a time.  A single scan may take several minutes. You may need more than one scan.    After your test  Drink plenty of fluids to help flush the X-ray dye from your body.  You may eat as soon as you want to.    Possible risks  All procedures have some risks. A CTA has some possible risks. These include:   Problems due to the X-ray dye, such as an allergic reaction or kidney damage  Skin damage from leaking X-ray dye near where the IV was put in  [employer] last reviewed this educational content on 8/1/2022  © 3287-5585 [employer]. All rights reserved. This information is not intended as a substitute for professional medical care. Always follow your healthcare professional's instructions.

## 2024-10-25 NOTE — IMAGING NOTE
TO RAD HOLDING AT 0755       HX TAKEN: PT WAS TOLD  SHE MIGHT HAVE SOMETHING WRONG WITH HER CORONARIES DUE TO  PREVIOUS ER VISIT FOR ANOTHER INCIDENT . NOT HERE FOR CTA     PT CONSENTED AT 0806      BASELINE VITAL SIGNS: HR 77   /66  BMI 34.7     CTA ORDERED BY SANDRA  WAS PT GIVEN CTA PREMEDS NO, IF YES METOPROLOL 100 MG LAST MAUDE AND THIS AM      METOPROLOL PO GIVEN  100 MILLIGRAMS  AT 0813     0905 VS /63   HR 67      METOPROLOL PO GIVEN 50 MILLIGRAMS  AT 0910      18 GAUGE IV STARTED AT 0905  POC TESTING COMPLETED GFR = 104   CREATINE = 0.8     SEE VS FLOWSHEET FOR BP AND HR PRIOR TO IV    TO CT TABLE @ 1022    CONNECT TO MONITOR  HR 59-72 /60      1022:  METOPROLOL 5 MILLIGRAMS GIVEN IV PUSH     NITROGLYCERIN 0.4 MILLIGRAMS SUBLINGUAL GIVEN AT 1026     CALCIUM SCORE COMPLETED AT n/a      1027 /61 HR 64     1027:  METOPROLOL 5 MILLIGRAMS GIVEN IV PUSH     INJECTION STARTED AT 1037 HR 51 MULTIPLE BEATS  DURING SCAN PROCEDURE COMPLETE    POST SCAN HR 71 /77 AT 1041    1048 PT TO HOLDING AREA  VS BP 99/67 HR 70  Q 15 MIN X2     1100 BP 98/62  HR 68     AVS  PROVIDED      1110  DISCHARGED HOME

## 2024-11-02 ENCOUNTER — LAB ENCOUNTER (OUTPATIENT)
Dept: LAB | Age: 29
End: 2024-11-02
Attending: STUDENT IN AN ORGANIZED HEALTH CARE EDUCATION/TRAINING PROGRAM
Payer: COMMERCIAL

## 2024-11-02 DIAGNOSIS — E78.5 HYPERLIPIDEMIA: Primary | ICD-10-CM

## 2024-11-02 LAB
CHOLEST SERPL-MCNC: 234 MG/DL (ref ?–200)
FASTING PATIENT LIPID ANSWER: YES
HDLC SERPL-MCNC: 36 MG/DL (ref 40–59)
LDLC SERPL CALC-MCNC: 169 MG/DL (ref ?–100)
NONHDLC SERPL-MCNC: 198 MG/DL (ref ?–130)
TRIGL SERPL-MCNC: 157 MG/DL (ref 30–149)
VLDLC SERPL CALC-MCNC: 31 MG/DL (ref 0–30)

## 2024-11-02 PROCEDURE — 83695 ASSAY OF LIPOPROTEIN(A): CPT

## 2024-11-02 PROCEDURE — 80061 LIPID PANEL: CPT

## 2024-11-02 PROCEDURE — 36415 COLL VENOUS BLD VENIPUNCTURE: CPT

## 2024-11-05 LAB — LIPOPROTEIN (A): 18.3 NMOL/L

## 2024-11-19 NOTE — CM/SW NOTE
09/23/23 1200   Discharge disposition   Expected discharge disposition Home or Self   Discharge transportation Private car     Pt to dc home today on PO abx. [facility].  Santos Reich RN, BSN  Nurse   [phone] Current and Past Psychiatric Diagnoses/Presenting Symptoms/Treatment Related Factors/Activating Events/Stressors

## 2025-07-29 ENCOUNTER — TELEPHONE (OUTPATIENT)
Dept: INTERNAL MEDICINE CLINIC | Facility: CLINIC | Age: 30
End: 2025-07-29

## (undated) DEVICE — TIP COVER ACCESSORY

## (undated) DEVICE — SYSTEM SURGYPAD VELCRO 36IN

## (undated) DEVICE — REDUCER: Brand: ENDOWRIST

## (undated) DEVICE — STAPLER 60 RELOAD BLUE: Brand: SUREFORM

## (undated) DEVICE — SUT VICRYL 2-0 J111T

## (undated) DEVICE — DRAPE SHEET LAPCHOLE 124X100X7

## (undated) DEVICE — SOL NACL IRRIG 0.9% 1000ML BTL

## (undated) DEVICE — 3M™ IOBAN™ 2 ANTIMICROBIAL INCISE DRAPE 6650EZ: Brand: IOBAN™ 2

## (undated) DEVICE — TRAY SURESTEP 16 BARDEX DRAIN

## (undated) DEVICE — GAMMEX® PI HYBRID SIZE 7.5, STERILE POWDER-FREE SURGICAL GLOVE, POLYISOPRENE AND NEOPRENE BLEND: Brand: GAMMEX

## (undated) DEVICE — TISSUE RETRIEVAL SYSTEM: Brand: INZII RETRIEVAL SYSTEM

## (undated) DEVICE — BLADELESS OBTURATOR: Brand: WECK VISTA

## (undated) DEVICE — SOL  0.9% 1000ML

## (undated) DEVICE — SUT MONOCRYL 4-0 PS-2 Y426H

## (undated) DEVICE — STAPLER 45 RELOAD WHITE: Brand: ENDOWRIST

## (undated) DEVICE — ARM DRAPE

## (undated) DEVICE — CANNULA SEAL

## (undated) DEVICE — ELECTRO LUBE IS A SINGLE PATIENT USE DEVICE THAT IS INTENDED TO BE USED ON ELECTROSURGICAL ELECTRODES TO REDUCE STICKING.: Brand: KEY SURGICAL ELECTRO LUBE

## (undated) DEVICE — VIOLET BRAIDED (POLYGLACTIN 910), SYNTHETIC ABSORBABLE SUTURE: Brand: COATED VICRYL

## (undated) DEVICE — SUT VICRYL 0 J906G

## (undated) DEVICE — SUT VICRYL 3-0 SH J416H

## (undated) DEVICE — SEAL

## (undated) DEVICE — ROBOTIC: Brand: MEDLINE INDUSTRIES, INC.

## (undated) DEVICE — DERMABOND CLOSURE 0.7ML TOPICL

## (undated) DEVICE — INSUFFLATION NEEDLE TO ESTABLISH PNEUMOPERITONEUM.: Brand: INSUFFLATION NEEDLE

## (undated) DEVICE — GAMMEX® PI HYBRID SIZE 7, STERILE POWDER-FREE SURGICAL GLOVE, POLYISOPRENE AND NEOPRENE BLEND: Brand: GAMMEX

## (undated) DEVICE — COLUMN DRAPE

## (undated) DEVICE — PROXIMATE SKIN STAPLERS (35 WIDE) CONTAINS 35 STAINLESS STEEL STAPLES (FIXED HEAD): Brand: PROXIMATE

## (undated) DEVICE — CLIP HEMOLOK LARGE PURPLE

## (undated) DEVICE — FENESTRATED BIPOLAR FORCEPS: Brand: ENDOWRIST

## (undated) DEVICE — STAPLER 60: Brand: SUREFORM

## (undated) NOTE — LETTER
[address]  Authorization for Surgical Operation and Procedure                                                                                           I hereby authorize * Surgery not found *, my physician and his/her assistants (if applicable), which may include medical students, residents, and/or fellows, to perform the following surgical operation/ procedure and administer such anesthesia as may be determined necessary by my physician: Operation/Procedure name (s)  on [address]   2. I recognize that during the surgical operation/procedure, unforeseen conditions may necessitate additional or different procedures than those listed above. I, therefore, further authorize and request that the above-named surgeon, assistants, or designees perform such procedures as are, in their judgment, necessary and desirable. 3.   My surgeon/physician has discussed prior to my surgery the potential benefits, risks and side effects of this procedure; the likelihood of achieving goals; and potential problems that might occur during recuperation. They also discussed reasonable alternatives to the procedure, including risks, benefits, and side effects related to the alternatives and risks related to not receiving this procedure. I have had all my questions answered and I acknowledge that no guarantee has been made as to the result that may be obtained. 4.   Should the need arise during my operation/procedure, which includes change of level of care prior to discharge, I also consent to the administration of blood and/or blood products. Further, I understand that despite careful testing and screening of blood or blood products by collecting agencies, I may still be subject to ill effects as a result of receiving a blood transfusion and/or blood products.   The following are some, but not all, of the potential risks that can occur: fever and allergic reactions, hemolytic reactions, transmission of diseases such as Hepatitis, AIDS and Cytomegalovirus (CMV) and fluid overload. In the event that I wish to have an autologous transfusion of my own blood, or a directed donor transfusion, I will discuss this with my physician. Check only if Refusing Blood or Blood Products  I understand refusal of blood or blood products as deemed necessary by my physician may have serious consequences to my condition to include possible death. I hereby assume responsibility for my refusal and release the hospital, its personnel, and my physicians from any responsibility for the consequences of my refusal.    o  Refuse   5. I authorize the use of any specimen, organs, tissues, body parts or foreign objects that may be removed from my body during the operation/procedure for diagnosis, research or teaching purposes and their subsequent disposal by hospital authorities. I also authorize the release of specimen test results and/or written reports to my treating physician on the hospital medical staff or other referring or consulting physicians involved in my care, at the discretion of the Pathologist or my treating physician. 6.   I consent to the photographing or videotaping of the operations or procedures to be performed, including appropriate portions of my body for medical, scientific, or educational purposes, provided my identity is not revealed by the pictures or by descriptive texts accompanying them. If the procedure has been photographed/videotaped, the surgeon will obtain the original picture, image, videotape or CD. The hospital will not be responsible for storage, release or maintenance of the picture, image, tape or CD.    7.   I consent to the presence of a  or observers in the operating room as deemed necessary by my physician or their designees.     8.   I recognize that in the event my procedure results in extended X-Ray/fluoroscopy time, I may develop a skin reaction. 9. If I have a Do Not Attempt Resuscitation (DNAR) order in place, that status will be suspended while in the operating room, procedural suite, and during the recovery period unless otherwise explicitly stated by me (or a person authorized to consent on my behalf). The surgeon or my attending physician will determine when the applicable recovery period ends for purposes of reinstating the DNAR order. 10. Patients having a sterilization procedure: I understand that if the procedure is successful the results will be permanent and it will therefore be impossible for me to inseminate, conceive, or bear children. I also understand that the procedure is intended to result in sterility, although the result has not been guaranteed. 11. I acknowledge that my physician has explained sedation/analgesia administration to me including the risk and benefits I consent to the administration of sedation/analgesia as may be necessary or desirable in the judgment of my physician. I CERTIFY THAT I HAVE READ AND FULLY UNDERSTAND THE ABOVE CONSENT TO OPERATION and/or OTHER PROCEDURE.     _________________________________________ _________________________________     ___________________________________  Signature of Patient     Signature of Responsible Person                   Printed Name of Responsible Person                              _________________________________________ ______________________________        ___________________________________  Signature of Witness         Date  Time         Relationship to Patient    STATEMENT OF PHYSICIAN My signature below affirms that prior to the time of the procedure; I have explained to the patient and/or his/her legal representative, the risks and benefits involved in the proposed treatment and any reasonable alternative to the proposed treatment.  I have also explained the risks and benefits involved in refusal of the proposed treatment and alternatives to the proposed treatment and have answered the patient's questions.  If I have a significant financial interest in a co-management agreement or a significant financial interest in any product or implant, or other significant relationship used in this procedure/surgery, I have disclosed this and had a discussion with my patient.     _______________________________________________________________ _____________________________  Liudmila Regan Physician)                                                                                         (Date)                                   (Time)  Patient Name: Tima Ford    : 3/28/1995   Printed: 8/10/2023      Medical Record #: [id number]                                              Page 1 of 1

## (undated) NOTE — MR AVS SNAPSHOT
[facility]  [address] [place] [address], [address] [place] [address]               Thank you for choosing us for your health care visit with Lester Corbett MD.  We are glad to serve you and happy to provide you with this summary of

## (undated) NOTE — Clinical Note
Spoke with pt today for TCM--prefers to f/u with specialists only, at this time--declines TCM appt. Sent TE to office staff as FYI/TCM protocol--thank you.

## (undated) NOTE — LETTER
2018              [address] 20  3/28/1995        [address]el 47         To Whom It May Concern,    Immunization History   Administered Date(s) Administered   • DPT/HIB 1997, 1997, 10/23/1997   • DTAP

## (undated) NOTE — LETTER
To Whom It May Concern:    Pamela Cm has been under our care regarding ongoing medical issues. Because of this, she has been required to restrict her physical activities. She should be excused from work from 8/7/23-8/16/23. She may resume her usual activities, including work, on 8/17/23 with the following restrictions:    []  None     [x]    No heavy lifting (over 15 pounds) for               weeks   []    Part-time (no more than             hours per week) for               week   []  Other:        Please feel free to contact us if there are any questions.       Sincerely,        Femi Fermin MD    [facility] HOSPITALIST  [address]  [address] [address] [address] [phone]        Document generated by:  Femi Fermin MD

## (undated) NOTE — LETTER
9/23/2023          Patient Name: Leno Scherer      To Whom it may concern:    Leno Scherer was admitted in hospital from 9/22/2023 to 9/23/2023 for treatment of a medical condition. Please excuse Leno Scherer from attending work from 9/22/2023 through 9/27/2023. The patient may return to work thereafter. If any future correspondence is necessary, please communicate with patient's regular primary care physician.       Thank you,        Hilda Gonzalez MD  Hospitalist / Internal Medicine  9/23/2023  [phone]             Document generated by:  Hilda Gonzalez MD

## (undated) NOTE — LETTER
[address], [place]  Authorization for Invasive Procedures  Date: 09/22/23           Time: 11:25    I hereby authorize Dr Michoacano Rcihardson, my physician and his/her assistants (if applicable), which may include medical students, residents, and/or fellows, to perform the following surgical operation/ procedure and administer such anesthesia as may be determined necessary by my physician: percutaneous drainage of pelvic fluid collection on [address]  2. I recognize that during the surgical operation/procedure, unforeseen conditions may necessitate additional or different procedures than those listed above. I, therefore, further authorize and request that the above-named surgeon, assistants, or designees perform such procedures as are, in their judgment, necessary and desirable. 3.   My surgeon/physician has discussed prior to my surgery the potential benefits, risks and side effects of this procedure; the likelihood of achieving goals; and potential problems that might occur during recuperation. They also discussed reasonable alternatives to the procedure, including risks, benefits, and side effects related to the alternatives and risks related to not receiving this procedure. I have had all my questions answered and I acknowledge that no guarantee has been made as to the result that may be obtained. 4.   Should the need arise during my operation/procedure, which includes change of level of care prior to discharge, I also consent to the administration of blood and/or blood products. Further, I understand that despite careful testing and screening of blood or blood products by collecting agencies, I may still be subject to ill effects as a result of receiving a blood transfusion and/or blood products.   The following are some, but not all, of the potential risks that can occur: fever and allergic reactions, hemolytic reactions, transmission of diseases such as Hepatitis, AIDS and Cytomegalovirus (CMV) and fluid overload. In the event that I wish to have an autologous transfusion of my own blood, or a directed donor transfusion, I will discuss this with my physician. Check only if Refusing Blood or Blood Products  I understand refusal of blood or blood products as deemed necessary by my physician may have serious consequences to my condition to include possible death. I hereby assume responsibility for my refusal and release the hospital, its personnel, and my physicians from any responsibility for the consequences of my refusal.         o  Refuse         5. I authorize the use of any specimen, organs, tissues, body parts or foreign objects that may be removed from my body during the operation/procedure for diagnosis, research or teaching purposes and their subsequent disposal by hospital authorities. I also authorize the release of specimen test results and/or written reports to my treating physician on the hospital medical staff or other referring or consulting physicians involved in my care, at the discretion of the Pathologist or my treating physician. 6.   I consent to the photographing or videotaping of the operations or procedures to be performed, including appropriate portions of my body for medical, scientific, or educational purposes, provided my identity is not revealed by the pictures or by descriptive texts accompanying them. If the procedure has been photographed/videotaped, the surgeon will obtain the original picture, image, videotape or CD. The hospital will not be responsible for storage, release or maintenance of the picture, image, tape or CD.    7.   I consent to the presence of a  or observers in the operating room as deemed necessary by my physician or their designees. 8.   I recognize that in the event my procedure results in extended X-Ray/fluoroscopy time, I may develop a skin reaction. 9.  If I have a Do Not Attempt Resuscitation (DNAR) order in place, that status will be suspended while in the operating room, procedural suite, and during the recovery period unless otherwise explicitly stated by me (or a person authorized to consent on my behalf). The surgeon or my attending physician will determine when the applicable recovery period ends for purposes of reinstating the DNAR order. 10. Patients having a sterilization procedure: I understand that if the procedure is successful the results will be permanent and it will therefore be impossible for me to inseminate, conceive, or bear children. I also understand that the procedure is intended to result in sterility, although the result has not been guaranteed. 11. I acknowledge that my physician has explained sedation/analgesia administration to me including the risk and benefits I consent to the administration of sedation/analgesia as may be necessary or desirable in the judgment of my physician.     I CERTIFY THAT I HAVE READ AND FULLY UNDERSTAND THE ABOVE CONSENT TO OPERATION and/or OTHER PROCEDURE.        ____________________________________       _________________________________      ______________________________  Signature of Patient         Signature of Responsible Person        Printed Name of Responsible Person        ____________________________________      _________________________________      ______________________________       Signature of Witness          Relationship to Patient                       Date                                       Time    Patient Name: Debbie Sparks     : 3/28/1995                 Printed: 2023      Medical Record #: [id number]                      Page 1 of 2          [place] My signature below affirms that prior to the time of the procedure; I have explained to the patient and/or his/her legal representative, the risks and benefits involved in the proposed treatment and any reasonable alternative to the proposed treatment. I have also explained the risks and benefits involved in refusal of the proposed treatment and alternatives to the proposed treatment and have answered the patient's questions.  If I have a significant financial interest in a co-management agreement or a significant financial interest in any product or implant, or other significant relationship used in this procedure/surgery, I have disclosed this and had a discussion with my patient.     _______________________________________________________________ _____________________________  Tiarra Grief of Physician)                                                                                         (Date)                                   (Time)    Patient Name: Mervat Pike     : 3/28/1995                 Printed: 2023      Medical Record #: [id number]                      Page 2 of 2

## (undated) NOTE — LETTER
AUTHORIZATION FOR SURGICAL OPERATION OR OTHER PROCEDURE    1.  I hereby authorize Dr. Jerzy Smith  and [facility] staff assigned to my case to perform the following operation and/or procedure at the [facility], [facility]:    Nail avulsion on Left big toe Time:  ________ A. M.  P.M.        Patient Name:  ______________________________________________________  (please print)      Patient signature:  ___________________________________________________             Relationship to Patient:           []  Parent

## (undated) NOTE — LETTER
[address], [place]  Authorization for Invasive Procedures  Date: 08/07/2023           Time: 1448    I hereby authorize Dr. Rafal Rodríguez, my physician and his/her assistants (if applicable), which may include medical students, residents, and/or fellows, to perform the following surgical operation/ procedure and administer such anesthesia as may be determined necessary by my physician: [address]  2. I recognize that during the surgical operation/procedure, unforeseen conditions may necessitate additional or different procedures than those listed above. I, therefore, further authorize and request that the above-named surgeon, assistants, or designees perform such procedures as are, in their judgment, necessary and desirable. 3.   My surgeon/physician has discussed prior to my surgery the potential benefits, risks and side effects of this procedure; the likelihood of achieving goals; and potential problems that might occur during recuperation. They also discussed reasonable alternatives to the procedure, including risks, benefits, and side effects related to the alternatives and risks related to not receiving this procedure. I have had all my questions answered and I acknowledge that no guarantee has been made as to the result that may be obtained. 4.   Should the need arise during my operation/procedure, which includes change of level of care prior to discharge, I also consent to the administration of blood and/or blood products. Further, I understand that despite careful testing and screening of blood or blood products by collecting agencies, I may still be subject to ill effects as a result of receiving a blood transfusion and/or blood products.   The following are some, but not all, of the potential risks that can occur: fever and allergic reactions, hemolytic reactions, transmission of diseases such as Hepatitis, AIDS and Cytomegalovirus (CMV) and fluid overload. In the event that I wish to have an autologous transfusion of my own blood, or a directed donor transfusion, I will discuss this with my physician. Check only if Refusing Blood or Blood Products  I understand refusal of blood or blood products as deemed necessary by my physician may have serious consequences to my condition to include possible death. I hereby assume responsibility for my refusal and release the hospital, its personnel, and my physicians from any responsibility for the consequences of my refusal.         o  Refuse         5. I authorize the use of any specimen, organs, tissues, body parts or foreign objects that may be removed from my body during the operation/procedure for diagnosis, research or teaching purposes and their subsequent disposal by hospital authorities. I also authorize the release of specimen test results and/or written reports to my treating physician on the hospital medical staff or other referring or consulting physicians involved in my care, at the discretion of the Pathologist or my treating physician. 6.   I consent to the photographing or videotaping of the operations or procedures to be performed, including appropriate portions of my body for medical, scientific, or educational purposes, provided my identity is not revealed by the pictures or by descriptive texts accompanying them. If the procedure has been photographed/videotaped, the surgeon will obtain the original picture, image, videotape or CD. The hospital will not be responsible for storage, release or maintenance of the picture, image, tape or CD.    7.   I consent to the presence of a  or observers in the operating room as deemed necessary by my physician or their designees. 8.   I recognize that in the event my procedure results in extended X-Ray/fluoroscopy time, I may develop a skin reaction. 9.  If I have a Do Not Attempt Resuscitation (DNAR) order in place, that status will be suspended while in the operating room, procedural suite, and during the recovery period unless otherwise explicitly stated by me (or a person authorized to consent on my behalf). The surgeon or my attending physician will determine when the applicable recovery period ends for purposes of reinstating the DNAR order. 10. Patients having a sterilization procedure: I understand that if the procedure is successful the results will be permanent and it will therefore be impossible for me to inseminate, conceive, or bear children. I also understand that the procedure is intended to result in sterility, although the result has not been guaranteed. 11. I acknowledge that my physician has explained sedation/analgesia administration to me including the risk and benefits I consent to the administration of sedation/analgesia as may be necessary or desirable in the judgment of my physician.     I CERTIFY THAT I HAVE READ AND FULLY UNDERSTAND THE ABOVE CONSENT TO OPERATION and/or OTHER PROCEDURE.        ____________________________________       _________________________________      ______________________________  Signature of Patient         Signature of Responsible Person        Printed Name of Responsible Person        ____________________________________      _________________________________      ______________________________       Signature of Witness          Relationship to Patient                       Date                                       Time    Patient Name: Mohit Roblero     : 3/28/1995                 Printed: 2023      Medical Record #: [id number]                      Page 1 of 2          [place] My signature below affirms that prior to the time of the procedure; I have explained to the patient and/or his/her legal representative, the risks and benefits involved in the proposed treatment and any reasonable alternative to the proposed treatment. I have also explained the risks and benefits involved in refusal of the proposed treatment and alternatives to the proposed treatment and have answered the patient's questions.  If I have a significant financial interest in a co-management agreement or a significant financial interest in any product or implant, or other significant relationship used in this procedure/surgery, I have disclosed this and had a discussion with my patient.     _______________________________________________________________ _____________________________  Rissa Harmeet of Physician)                                                                                         (Date)                                   (Time)    Patient Name: Gina Kuhn     : 3/28/1995                 Printed: 2023      Medical Record #: [id number]                      Page 2 of 2

## (undated) NOTE — LETTER
[address]  Authorization for Surgical Operation and Procedure                                                                                           I hereby authorize Dr. Paige North, my physician and his/her assistants (if applicable), which may include medical students, residents, and/or fellows, to perform the following surgical operation/ procedure and administer such anesthesia as may be determined necessary by my physician: Operation/Procedure name (s) [address] [facility]  on [facility] [address]   2. I recognize that during the surgical operation/procedure, unforeseen conditions may necessitate additional or different procedures than those listed above. I, therefore, further authorize and request that the above-named surgeon, assistants, or designees perform such procedures as are, in their judgment, necessary and desirable. 3.   My surgeon/physician has discussed prior to my surgery the potential benefits, risks and side effects of this procedure; the likelihood of achieving goals; and potential problems that might occur during recuperation. They also discussed reasonable alternatives to the procedure, including risks, benefits, and side effects related to the alternatives and risks related to not receiving this procedure. I have had all my questions answered and I acknowledge that no guarantee has been made as to the result that may be obtained. 4.   Should the need arise during my operation/procedure, which includes change of level of care prior to discharge, I also consent to the administration of blood and/or blood products. Further, I understand that despite careful testing and screening of blood or blood products by collecting agencies, I may still be subject to ill effects as a result of receiving a blood transfusion and/or blood products.   The following are some, but not all, of the potential risks that can occur: fever and allergic reactions, hemolytic reactions, transmission of diseases such as Hepatitis, AIDS and Cytomegalovirus (CMV) and fluid overload. In the event that I wish to have an autologous transfusion of my own blood, or a directed donor transfusion, I will discuss this with my physician. Check only if Refusing Blood or Blood Products  I understand refusal of blood or blood products as deemed necessary by my physician may have serious consequences to my condition to include possible death. I hereby assume responsibility for my refusal and release the hospital, its personnel, and my physicians from any responsibility for the consequences of my refusal.    o  Refuse   5. I authorize the use of any specimen, organs, tissues, body parts or foreign objects that may be removed from my body during the operation/procedure for diagnosis, research or teaching purposes and their subsequent disposal by hospital authorities. I also authorize the release of specimen test results and/or written reports to my treating physician on the hospital medical staff or other referring or consulting physicians involved in my care, at the discretion of the Pathologist or my treating physician. 6.   I consent to the photographing or videotaping of the operations or procedures to be performed, including appropriate portions of my body for medical, scientific, or educational purposes, provided my identity is not revealed by the pictures or by descriptive texts accompanying them. If the procedure has been photographed/videotaped, the surgeon will obtain the original picture, image, videotape or CD. The hospital will not be responsible for storage, release or maintenance of the picture, image, tape or CD.    7.   I consent to the presence of a  or observers in the operating room as deemed necessary by my physician or their designees.     8.   I recognize that in the event my procedure results in extended X-Ray/fluoroscopy time, I may develop a skin reaction. 9. If I have a Do Not Attempt Resuscitation (DNAR) order in place, that status will be suspended while in the operating room, procedural suite, and during the recovery period unless otherwise explicitly stated by me (or a person authorized to consent on my behalf). The surgeon or my attending physician will determine when the applicable recovery period ends for purposes of reinstating the DNAR order. 10. Patients having a sterilization procedure: I understand that if the procedure is successful the results will be permanent and it will therefore be impossible for me to inseminate, conceive, or bear children. I also understand that the procedure is intended to result in sterility, although the result has not been guaranteed. 11. I acknowledge that my physician has explained sedation/analgesia administration to me including the risk and benefits I consent to the administration of sedation/analgesia as may be necessary or desirable in the judgment of my physician. I CERTIFY THAT I HAVE READ AND FULLY UNDERSTAND THE ABOVE CONSENT TO OPERATION and/or OTHER PROCEDURE.     _________________________________________ _________________________________     ___________________________________  Signature of Patient     Signature of Responsible Person                   Printed Name of Responsible Person                              _________________________________________ ______________________________        ___________________________________  Signature of Witness         Date  Time         Relationship to Patient    STATEMENT OF PHYSICIAN My signature below affirms that prior to the time of the procedure; I have explained to the patient and/or his/her legal representative, the risks and benefits involved in the proposed treatment and any reasonable alternative to the proposed treatment.  I have also explained the risks and benefits involved in refusal of the proposed treatment and alternatives to the proposed treatment and have answered the patient's questions.  If I have a significant financial interest in a co-management agreement or a significant financial interest in any product or implant, or other significant relationship used in this procedure/surgery, I have disclosed this and had a discussion with my patient.     _______________________________________________________________ _____________________________  Queenie Cowan of Physician)                                                                                         (Date)                                   (Time)  Patient Name: Miguel Angel Metzger    : 3/28/1995   Printed: 8/10/2023      Medical Record #: [id number]                                              Page 1 of 1